# Patient Record
Sex: MALE | Race: WHITE | Employment: OTHER | URBAN - METROPOLITAN AREA
[De-identification: names, ages, dates, MRNs, and addresses within clinical notes are randomized per-mention and may not be internally consistent; named-entity substitution may affect disease eponyms.]

---

## 2017-04-10 ENCOUNTER — HOSPITAL ENCOUNTER (EMERGENCY)
Age: 62
Discharge: HOME | End: 2017-04-10
Attending: EMERGENCY MEDICINE
Payer: COMMERCIAL

## 2017-04-10 ENCOUNTER — APPOINTMENT (OUTPATIENT)
Dept: CT IMAGING | Age: 62
End: 2017-04-10
Attending: PHYSICIAN ASSISTANT
Payer: COMMERCIAL

## 2017-04-10 VITALS
WEIGHT: 225 LBS | DIASTOLIC BLOOD PRESSURE: 71 MMHG | HEART RATE: 63 BPM | TEMPERATURE: 97.7 F | HEIGHT: 72 IN | OXYGEN SATURATION: 94 % | SYSTOLIC BLOOD PRESSURE: 124 MMHG | BODY MASS INDEX: 30.48 KG/M2 | RESPIRATION RATE: 16 BRPM

## 2017-04-10 DIAGNOSIS — J18.9 CAP (COMMUNITY ACQUIRED PNEUMONIA): ICD-10-CM

## 2017-04-10 DIAGNOSIS — R30.0 DYSURIA: Primary | ICD-10-CM

## 2017-04-10 DIAGNOSIS — N20.0 NEPHROLITHIASIS: ICD-10-CM

## 2017-04-10 DIAGNOSIS — R31.0 GROSS HEMATURIA: ICD-10-CM

## 2017-04-10 LAB
ALBUMIN SERPL BCP-MCNC: 4.3 G/DL (ref 3.5–5)
ALBUMIN/GLOB SERPL: 1 {RATIO} (ref 1.1–2.2)
ALP SERPL-CCNC: 106 U/L (ref 45–117)
ALT SERPL-CCNC: 72 U/L (ref 12–78)
ANION GAP BLD CALC-SCNC: 9 MMOL/L (ref 5–15)
APPEARANCE UR: ABNORMAL
AST SERPL W P-5'-P-CCNC: 46 U/L (ref 15–37)
BACTERIA URNS QL MICRO: NEGATIVE /HPF
BASOPHILS # BLD AUTO: 0.1 K/UL (ref 0–0.1)
BASOPHILS # BLD: 1 % (ref 0–1)
BILIRUB SERPL-MCNC: 0.5 MG/DL (ref 0.2–1)
BILIRUB UR QL CFM: NEGATIVE
BUN SERPL-MCNC: 9 MG/DL (ref 6–20)
BUN/CREAT SERPL: 9 (ref 12–20)
CALCIUM SERPL-MCNC: 10.6 MG/DL (ref 8.5–10.1)
CHLORIDE SERPL-SCNC: 102 MMOL/L (ref 97–108)
CO2 SERPL-SCNC: 24 MMOL/L (ref 21–32)
COLOR UR: ABNORMAL
CREAT SERPL-MCNC: 0.97 MG/DL (ref 0.7–1.3)
EOSINOPHIL # BLD: 0.4 K/UL (ref 0–0.4)
EOSINOPHIL NFR BLD: 6 % (ref 0–7)
EPITH CASTS URNS QL MICRO: ABNORMAL /LPF
ERYTHROCYTE [DISTWIDTH] IN BLOOD BY AUTOMATED COUNT: 13.7 % (ref 11.5–14.5)
GLOBULIN SER CALC-MCNC: 4.5 G/DL (ref 2–4)
GLUCOSE SERPL-MCNC: 179 MG/DL (ref 65–100)
GLUCOSE UR STRIP.AUTO-MCNC: NEGATIVE MG/DL
HCT VFR BLD AUTO: 49.8 % (ref 36.6–50.3)
HGB BLD-MCNC: 17 G/DL (ref 12.1–17)
HGB UR QL STRIP: ABNORMAL
KETONES UR QL STRIP.AUTO: 15 MG/DL
LEUKOCYTE ESTERASE UR QL STRIP.AUTO: NEGATIVE
LYMPHOCYTES # BLD AUTO: 22 % (ref 12–49)
LYMPHOCYTES # BLD: 1.5 K/UL (ref 0.8–3.5)
MCH RBC QN AUTO: 30.2 PG (ref 26–34)
MCHC RBC AUTO-ENTMCNC: 34.1 G/DL (ref 30–36.5)
MCV RBC AUTO: 88.5 FL (ref 80–99)
MONOCYTES # BLD: 0.6 K/UL (ref 0–1)
MONOCYTES NFR BLD AUTO: 9 % (ref 5–13)
MUCOUS THREADS URNS QL MICRO: ABNORMAL /LPF
NEUTS SEG # BLD: 4.4 K/UL (ref 1.8–8)
NEUTS SEG NFR BLD AUTO: 62 % (ref 32–75)
NITRITE UR QL STRIP.AUTO: NEGATIVE
PH UR STRIP: 6 [PH] (ref 5–8)
PLATELET # BLD AUTO: 165 K/UL (ref 150–400)
POTASSIUM SERPL-SCNC: 3.8 MMOL/L (ref 3.5–5.1)
PROT SERPL-MCNC: 8.8 G/DL (ref 6.4–8.2)
PROT UR STRIP-MCNC: 300 MG/DL
RBC # BLD AUTO: 5.63 M/UL (ref 4.1–5.7)
RBC #/AREA URNS HPF: >100 /HPF (ref 0–5)
SODIUM SERPL-SCNC: 135 MMOL/L (ref 136–145)
SP GR UR REFRACTOMETRY: 1.02 (ref 1–1.03)
UROBILINOGEN UR QL STRIP.AUTO: 0.2 EU/DL (ref 0.2–1)
WBC # BLD AUTO: 7 K/UL (ref 4.1–11.1)
WBC URNS QL MICRO: ABNORMAL /HPF (ref 0–4)

## 2017-04-10 PROCEDURE — 74011250636 HC RX REV CODE- 250/636: Performed by: PHYSICIAN ASSISTANT

## 2017-04-10 PROCEDURE — 36415 COLL VENOUS BLD VENIPUNCTURE: CPT | Performed by: EMERGENCY MEDICINE

## 2017-04-10 PROCEDURE — 96361 HYDRATE IV INFUSION ADD-ON: CPT

## 2017-04-10 PROCEDURE — 81001 URINALYSIS AUTO W/SCOPE: CPT | Performed by: EMERGENCY MEDICINE

## 2017-04-10 PROCEDURE — 80053 COMPREHEN METABOLIC PANEL: CPT | Performed by: EMERGENCY MEDICINE

## 2017-04-10 PROCEDURE — 85025 COMPLETE CBC W/AUTO DIFF WBC: CPT | Performed by: EMERGENCY MEDICINE

## 2017-04-10 PROCEDURE — 74176 CT ABD & PELVIS W/O CONTRAST: CPT

## 2017-04-10 PROCEDURE — 99283 EMERGENCY DEPT VISIT LOW MDM: CPT

## 2017-04-10 PROCEDURE — 96374 THER/PROPH/DIAG INJ IV PUSH: CPT

## 2017-04-10 RX ORDER — AMOXICILLIN AND CLAVULANATE POTASSIUM 875; 125 MG/1; MG/1
1 TABLET, FILM COATED ORAL 2 TIMES DAILY
Qty: 14 TAB | Refills: 0 | Status: SHIPPED | OUTPATIENT
Start: 2017-04-10 | End: 2017-04-17

## 2017-04-10 RX ORDER — KETOROLAC TROMETHAMINE 30 MG/ML
15 INJECTION, SOLUTION INTRAMUSCULAR; INTRAVENOUS
Status: COMPLETED | OUTPATIENT
Start: 2017-04-10 | End: 2017-04-10

## 2017-04-10 RX ADMIN — SODIUM CHLORIDE 1000 ML: 900 INJECTION, SOLUTION INTRAVENOUS at 11:01

## 2017-04-10 RX ADMIN — KETOROLAC TROMETHAMINE 15 MG: 30 INJECTION INTRAMUSCULAR; INTRAVENOUS at 11:01

## 2017-04-10 NOTE — ED NOTES
Discharge instructions given by provider. Pt ambulates to front door with steady gait and in stable condition.

## 2017-04-10 NOTE — ED TRIAGE NOTES
Pt reports having bright red blood in his urine with difficultly urinating. Pt reports HX of same symptoms with kidney stones.

## 2017-04-10 NOTE — DISCHARGE INSTRUCTIONS
Painful Urination (Dysuria): Care Instructions  Your Care Instructions  Burning pain with urination (dysuria) is a common symptom of a urinary tract infection or other urinary problems. The bladder may become inflamed. This can cause pain when the bladder fills and empties. You may also feel pain if the tube that carries urine from the bladder to the outside of the body (urethra) gets irritated or infected. Sexually transmitted infections (STIs) also may cause pain when you urinate. Sometimes the pain can be caused by things other than an infection. The urethra can be irritated by soaps, perfumes, or foreign objects in the urethra. Kidney stones can cause pain when they pass through the urethra. The cause may be hard to find. You may need tests. Treatment for painful urination depends on the cause. Follow-up care is a key part of your treatment and safety. Be sure to make and go to all appointments, and call your doctor if you are having problems. It's also a good idea to know your test results and keep a list of the medicines you take. How can you care for yourself at home? · Drink extra water for the next day or two. This will help make the urine less concentrated. (If you have kidney, heart, or liver disease and have to limit fluids, talk with your doctor before you increase the amount of fluids you drink.)  · Avoid drinks that are carbonated or have caffeine. They can irritate the bladder. · Urinate often. Try to empty your bladder each time. For women:  · Urinate right after you have sex. · After going to the bathroom, wipe from front to back. · Avoid douches, bubble baths, and feminine hygiene sprays. And avoid other feminine hygiene products that have deodorants. When should you call for help? Call your doctor now or seek immediate medical care if:  · You have new symptoms, such as fever, nausea, or vomiting. · You have new or worse symptoms of a urinary problem.  For example:  ¨ You have blood or pus in your urine. ¨ You have chills or body aches. ¨ It hurts worse to urinate. ¨ You have groin or belly pain. ¨ You have pain in your back just below your rib cage (the flank area). Watch closely for changes in your health, and be sure to contact your doctor if you have any problems. Where can you learn more? Go to http://apollo-tavo.info/. Enter O422 in the search box to learn more about \"Painful Urination (Dysuria): Care Instructions. \"  Current as of: November 28, 2016  Content Version: 11.2  © 4983-8719 [employer]. Care instructions adapted under license by [employer] (which disclaims liability or warranty for this information). If you have questions about a medical condition or this instruction, always ask your healthcare professional. [place] [address] any warranty or liability for your use of this information. Blood in the Urine: Care Instructions  Your Care Instructions  Blood in the urine, or hematuria, may make the urine look red, brown, or pink. There may be blood every time you urinate or just from time to time. You cannot always see blood in the urine, but it will show up in a urine test.  Blood in the urine may be serious. It should always be checked by a doctor. Your doctor may recommend more tests, including an X-ray, a CT scan, or a cystoscopy (which lets a doctor look inside the urethra and bladder). Blood in the urine can be a sign of another problem. Common causes are bladder infections and kidney stones. An injury to your groin or your genital area can also cause bleeding in the urinary tract. Very hard exercise--such as running a marathon--can cause blood in the urine. Blood in the urine can also be a sign of kidney disease or cancer in the bladder or kidney. Many cases of blood in the urine are caused by a harmless condition that runs in families. This is called benign familial hematuria.  It does not need any treatment. Sometimes your urine may look red or brown even though it does not contain blood. For example, not getting enough fluids (dehydration), taking certain medicines, or having a liver problem can change the color of your urine. Eating foods such as beets, rhubarb, or blackberries or foods with red food coloring can make your urine look red or pink. Follow-up care is a key part of your treatment and safety. Be sure to make and go to all appointments, and call your doctor if you are having problems. It's also a good idea to know your test results and keep a list of the medicines you take. When should you call for help? Call your doctor now or seek immediate medical care if:  · You have symptoms of a urinary infection. For example:  ¨ You have pus in your urine. ¨ You have pain in your back just below your rib cage. This is called flank pain. ¨ You have a fever, chills, or body aches. ¨ It hurts to urinate. ¨ You have groin or belly pain. · You have more blood in your urine. Watch closely for changes in your health, and be sure to contact your doctor if:  · You have new urination problems. · You do not get better as expected. Where can you learn more? Go to http://apollo-tavo.info/. Enter M405 in the search box to learn more about \"Blood in the Urine: Care Instructions. \"  Current as of: August 12, 2016  Content Version: 11.2  © 1030-7650 [employer]. Care instructions adapted under license by [employer] (which disclaims liability or warranty for this information). If you have questions about a medical condition or this instruction, always ask your healthcare professional. [place] [address] any warranty or liability for your use of this information. Kidney Stone: Care Instructions  Your Care Instructions    Kidney stones are formed when salts, minerals, and other substances normally found in the urine clump together.  They can be as small as grains of sand or, rarely, as large as golf balls. While the stone is traveling through the ureter, which is the tube that carries urine from the kidney to the bladder, you will probably feel pain. The pain may be mild or very severe. You may also have some blood in your urine. As soon as the stone reaches the bladder, any intense pain should go away. If a stone is too large to pass on its own, you may need a medical procedure to help you pass the stone. The doctor has checked you carefully, but problems can develop later. If you notice any problems or new symptoms, get medical treatment right away. Follow-up care is a key part of your treatment and safety. Be sure to make and go to all appointments, and call your doctor if you are having problems. It's also a good idea to know your test results and keep a list of the medicines you take. How can you care for yourself at home? · Drink plenty of fluids, enough so that your urine is light yellow or clear like water. If you have kidney, heart, or liver disease and have to limit fluids, talk with your doctor before you increase the amount of fluids you drink. · Take pain medicines exactly as directed. Call your doctor if you think you are having a problem with your medicine. ¨ If the doctor gave you a prescription medicine for pain, take it as prescribed. ¨ If you are not taking a prescription pain medicine, ask your doctor if you can take an over-the-counter medicine. Read and follow all instructions on the label. · Your doctor may ask you to strain your urine so that you can collect your kidney stone when it passes. You can use a kitchen strainer or a tea strainer to catch the stone. Store it in a plastic bag until you see your doctor again. Preventing future kidney stones  Some changes in your diet may help prevent kidney stones.  Depending on the cause of your stones, your doctor may recommend that you:  · Drink plenty of fluids, enough so that your urine is light yellow or clear like water. If you have kidney, heart, or liver disease and have to limit fluids, talk with your doctor before you increase the amount of fluids you drink. · Limit coffee, tea, and alcohol. Also avoid grapefruit juice. · Do not take more than the recommended daily dose of vitamins C and D.  · Avoid antacids such as Gaviscon, Maalox, Mylanta, or Tums. · Limit the amount of salt (sodium) in your diet. · Eat a balanced diet that is not too high in protein. · Limit foods that are high in a substance called oxalate, which can cause kidney stones. These foods include dark green vegetables, rhubarb, chocolate, wheat bran, nuts, cranberries, and beans. When should you call for help? Call your doctor now or seek immediate medical care if:  · You cannot keep down fluids. · Your pain gets worse. · You have a fever or chills. · You have new or worse pain in your back just below your rib cage (the flank area). · You have new or more blood in your urine. Watch closely for changes in your health, and be sure to contact your doctor if:  · You do not get better as expected. Where can you learn more? Go to http://apollo-tavo.info/. Enter B251 in the search box to learn more about \"Kidney Stone: Care Instructions. \"  Current as of: November 20, 2015  Content Version: 11.2  © 2034-1734 [employer]. Care instructions adapted under license by [employer] (which disclaims liability or warranty for this information). If you have questions about a medical condition or this instruction, always ask your healthcare professional. [place] [address] any warranty or liability for your use of this information. Pneumonia: Care Instructions  Your Care Instructions    Pneumonia is an infection of the lungs. Most cases are caused by infections from bacteria or viruses. Pneumonia may be mild or very severe.  If it is caused by bacteria, you will be treated with antibiotics. It may take a few weeks to a few months to recover fully from pneumonia, depending on how sick you were and whether your overall health is good. Follow-up care is a key part of your treatment and safety. Be sure to make and go to all appointments, and call your doctor if you are having problems. Its also a good idea to know your test results and keep a list of the medicines you take. How can you care for yourself at home? · Take your antibiotics exactly as directed. Do not stop taking the medicine just because you are feeling better. You need to take the full course of antibiotics. · Take your medicines exactly as prescribed. Call your doctor if you think you are having a problem with your medicine. · Get plenty of rest and sleep. You may feel weak and tired for a while, but your energy level will improve with time. · To prevent dehydration, drink plenty of fluids, enough so that your urine is light yellow or clear like water. Choose water and other caffeine-free clear liquids until you feel better. If you have kidney, heart, or liver disease and have to limit fluids, talk with your doctor before you increase the amount of fluids you drink. · Take care of your cough so you can rest. A cough that brings up mucus from your lungs is common with pneumonia. It is one way your body gets rid of the infection. But if coughing keeps you from resting or causes severe fatigue and chest-wall pain, talk to your doctor. He or she may suggest that you take a medicine to reduce the cough. · Use a vaporizer or humidifier to add moisture to your bedroom. Follow the directions for cleaning the machine. · Do not smoke or allow others to smoke around you. Smoke will make your cough last longer. If you need help quitting, talk to your doctor about stop-smoking programs and medicines. These can increase your chances of quitting for good.   · Take an over-the-counter pain medicine, such as acetaminophen (Tylenol), ibuprofen (Advil, Motrin), or naproxen (Aleve). Read and follow all instructions on the label. · Do not take two or more pain medicines at the same time unless the doctor told you to. Many pain medicines have acetaminophen, which is Tylenol. Too much acetaminophen (Tylenol) can be harmful. · If you were given a spirometer to measure how well your lungs are working, use it as instructed. This can help your doctor tell how your recovery is going. · To prevent pneumonia in the future, talk to your doctor about getting a flu vaccine (once a year) and a pneumococcal vaccine (one time only for most people). When should you call for help? Call 911 anytime you think you may need emergency care. For example, call if:  · You have severe trouble breathing. Call your doctor now or seek immediate medical care if:  · You cough up dark brown or bloody mucus (sputum). · You have new or worse trouble breathing. · You are dizzy or lightheaded, or you feel like you may faint. Watch closely for changes in your health, and be sure to contact your doctor if:  · You have a new or higher fever. · You are coughing more deeply or more often. · You are not getting better after 2 days (48 hours). · You do not get better as expected. Where can you learn more? Go to http://apollo-tavo.info/. Enter [id number] in the search box to learn more about \"Pneumonia: Care Instructions. \"  Current as of: May 23, 2016  Content Version: 11.2  © 9266-6942 [employer]. Care instructions adapted under license by [employer] (which disclaims liability or warranty for this information). If you have questions about a medical condition or this instruction, always ask your healthcare professional. [address] any warranty or liability for your use of this information.

## 2017-04-10 NOTE — ED PROVIDER NOTES
HPI Comments: 60 y.o. male with past medical history significant for HTN, diabetes, depression, fatty liver disease, arthritis, arrhythmia, GERD, high cholesterol, and iron deficiency anemia who presents with chief complaint of hematuria. Pt complains of hematuria, dysuria, and urinary urgency that began earlier this morning. Pt describes his sx as a constant discomfort that becomes much more intense with urination. Pt notes that there are visible blood clots in his urine. No pain meds were taken PTA. Of note, pt mentions a h/o kidney stones and UTI. He states that he is followed by [facility], and was last seen by his urologist ~1 year ago. Pt denies h/o lithotripsy or other intervention for kidney stones. Pt denies flank pain, fever, vomiting, diarrhea, abd pain, CP, SOB, or cold sx. There are no other acute medical concerns at this time. Old Chart Review:  10:35 AM  Pt with a h/o kidney stones and UTI. Last UTI was 1/4/16. Additionally, pt was admitted to the hospital on 2/29/16 for a GI bleed. Social hx: denies tobacco use    PCP: Liliam Ellsworth MD    Note written by Rylan Castillo. Nicole Garcia, as dictated by Justin Dietrich PA-C 10:37 AM      The history is provided by the patient. No  was used.         Past Medical History:   Diagnosis Date    Arrhythmia     R BUNDLE BRANCH BLOCK    Arthritis     Diabetes ([facility] [address]     GERD (gastroesophageal reflux disease)     MARCUS'S ESOPHAGUS    Hypertension     Liver disease     FATTY LIVER    Other ill-defined conditions     High Cholesterol    Other ill-defined conditions     IRON DEFICIENT ANEMIA    Psychiatric disorder     Depression    Unspecified sleep apnea     CPAP       Past Surgical History:   Procedure Laterality Date    HX GI      CHOLECYSTECTOMY    HX GI      COLONOSCOPY    HX GI      ENDOSCOPY, UPPER    HX HEENT      ELELID REDUCTION    HX ORTHOPAEDIC Left     MENISCUS REPAIR    HX OTHER SURGICAL      LIPOMA EXCISION Family History:   Problem Relation Age of Onset    Hypertension Mother    Ambrocio Re Arthritis-osteo Mother     Alzheimer Father     Anesth Problems Neg Hx        Social History     Social History    Marital status:      Spouse name: N/A    Number of children: N/A    Years of education: N/A     Occupational History    Not on file. Social History Main Topics    Smoking status: Never Smoker    Smokeless tobacco: Never Used    Alcohol use Yes      Comment: 4-6 PER WEEK    Drug use: No    Sexual activity: Not on file     Other Topics Concern    Not on file     Social History Narrative         ALLERGIES: Review of patient's allergies indicates no known allergies. Review of Systems   Constitutional: Negative for chills and fever. HENT: Negative for congestion, ear pain, rhinorrhea and sore throat. Respiratory: Negative for cough and shortness of breath. Cardiovascular: Negative for chest pain. Gastrointestinal: Negative for abdominal pain, diarrhea, nausea and vomiting. Genitourinary: Positive for dysuria, hematuria and urgency. Negative for flank pain. Skin: Negative for rash. Neurological: Negative for headaches. All other systems reviewed and are negative. Vitals:    04/10/17 0852   BP: 137/90   Pulse: (!) 110   Resp: 16   Temp: 98.7 °F (37.1 °C)   SpO2: 97%   Weight: 102.1 kg (225 lb)   Height: 6' (1.829 m)            Physical Exam   Constitutional: He is oriented to person, place, and time. He appears well-developed and well-nourished. No distress. Pleasant, well-appearing WM   HENT:   Head: Normocephalic and atraumatic. Right Ear: External ear normal.   Left Ear: External ear normal.   Eyes: Conjunctivae are normal. No scleral icterus. Neck: Neck supple. No tracheal deviation present. Cardiovascular: Normal rate, regular rhythm and normal heart sounds. Exam reveals no gallop and no friction rub. No murmur heard.   Pulmonary/Chest: Effort normal and breath sounds normal. No stridor. No respiratory distress. He has no wheezes. He has no rales. Abdominal: Soft. He exhibits no distension. There is no tenderness. There is no rebound and no guarding. No CVAT   Musculoskeletal: Normal range of motion. Neurological: He is alert and oriented to person, place, and time. Skin: Skin is warm and dry. Psychiatric: He has a normal mood and affect. His behavior is normal.   Nursing note and vitals reviewed. MDM  Number of Diagnoses or Management Options  CAP (community acquired pneumonia):   Dysuria:   Gross hematuria:   Nephrolithiasis:   Diagnosis management comments: 60year old male presenting to the ED for acute onset of hematuria with voiding symptoms this morning. No flank/abd pain, vomiting, or fever. UA with large blood, no WBC, bacteria, other findings c/f infection. CT showing renal stones, no obstruction or hydronephrosis. Incidental finding noted of infectious/inflammatory collection in the left lower lobe, when asked again pt did report cough for about a month, will treat with augmentin. Explicitly explained to pt that he will need close PCP f/u to monitor improvement in cough and also possibly for repeat imaging. Also explained need for urology f/u to monitor hematuria. Amount and/or Complexity of Data Reviewed  Tests in the radiology section of CPT®: ordered and reviewed  Discuss the patient with other providers: yes (Dr. Umesh Fernando, ED attending)  Independent visualization of images, tracings, or specimens: yes (CT)      ED Course       Procedures         Pt reassessed. Discussed CT results. Explained that a stone could have already passed, no signs of infection in the urine. When asked again, pt notes that he has had a persistent, sometimes productive cough x 1 month. Given findings on CT will cover with abx. Discussed need for PCP and urology f/u.   MEGHAN Campbell  12:48 PM

## 2017-08-15 ENCOUNTER — OFFICE VISIT (OUTPATIENT)
Dept: CARDIOLOGY CLINIC | Age: 62
End: 2017-08-15

## 2017-08-15 VITALS
DIASTOLIC BLOOD PRESSURE: 60 MMHG | HEIGHT: 72 IN | HEART RATE: 99 BPM | SYSTOLIC BLOOD PRESSURE: 120 MMHG | OXYGEN SATURATION: 52 % | BODY MASS INDEX: 31.15 KG/M2 | WEIGHT: 230 LBS

## 2017-08-15 DIAGNOSIS — R01.1 CARDIAC MURMUR: Primary | ICD-10-CM

## 2017-08-15 NOTE — PROGRESS NOTES
HISTORY OF PRESENT ILLNESS  Josiane Barajas is a 60 y.o. male. Patient with h/o HTN, DM, GERD, HLD, depression, fatty liver, OLEGARIO on cpap  here for evaluation of murmur and recent echo results  echocardiogram on 7/17 normal lv size , hyperdynamic lv function mild lvh borderline increased aortc outflow velocities normal tv av mv and no dopple abnormalities  Nuclear stress test on 7/13 no ischemia or mi and normal ef  Past Medical History:   Diagnosis Date    Arrhythmia     R BUNDLE BRANCH BLOCK    Arthritis     Diabetes ([address]     GERD (gastroesophageal reflux disease)     MARCUS'S ESOPHAGUS    Hypertension     Liver disease     FATTY LIVER    Other ill-defined conditions     High Cholesterol    Other ill-defined conditions     IRON DEFICIENT ANEMIA    Psychiatric disorder     Depression    Unspecified sleep apnea     CPAP     Past Surgical History:   Procedure Laterality Date    HX GI      CHOLECYSTECTOMY    HX GI      COLONOSCOPY    HX GI      ENDOSCOPY, UPPER    HX HEENT      ELELID REDUCTION    HX ORTHOPAEDIC Left     MENISCUS REPAIR    HX OTHER SURGICAL      LIPOMA EXCISION     Social History     Social History    Marital status:      Spouse name: N/A    Number of children: N/A    Years of education: N/A     Occupational History    Not on file. Social History Main Topics    Smoking status: Never Smoker    Smokeless tobacco: Never Used    Alcohol use Yes      Comment: 4-6 PER WEEK    Drug use: No    Sexual activity: Not on file     Other Topics Concern    Not on file     Social History Narrative       HPI  Not very active, he denies any cp or sob, occasional fatigue reported and seldom dizziness,   Review of Systems   Constitutional: Positive for malaise/fatigue. Respiratory: Negative. Cardiovascular: Negative. Neurological: Positive for dizziness.      Visit Vitals    /60 (BP 1 Location: Left arm, BP Patient Position: Sitting)    Pulse 99    Ht 6' (1.829 m)  Wt 104.3 kg (230 lb)    SpO2 (!) 52%    BMI 31.19 kg/m2       Physical Exam   Neck: No JVD present. Carotid bruit is not present. Cardiovascular: Regular rhythm. Bradycardia present. Pulmonary/Chest: Effort normal and breath sounds normal.   Abdominal: Soft. Musculoskeletal: He exhibits no edema. Psychiatric: He has a normal mood and affect. Current Outpatient Prescriptions on File Prior to Visit   Medication Sig Dispense Refill    insulin glargine (TOUJEO SOLOSTAR) 300 unit/mL (1.5 mL) inpn 80 Units by SubCUTAneous route every morning.  sitaGLIPtin-metFORMIN (JANUMET) 50-1,000 mg per tablet Take 1 Tab by mouth two (2) times daily (with meals).  empagliflozin (JARDIANCE) 10 mg tablet Take 10 mg by mouth daily.  Liraglutide (VICTOZA) 0.6 mg/0.1 mL (18 mg/3 mL) sub-q pen 1.8 mg by SubCUTAneous route daily.  felodipine (PLENDIL SR) 5 mg 24 hr tablet Take 5 mg by mouth daily.  diphenhydrAMINE (BENADRYL) 25 mg capsule Take 25 mg by mouth every six (6) hours as needed.  diphenhydramine-acetaminophen (EXCEDRIN PM)  mg Tab Take 2 Tabs by mouth daily as needed.  Cholecalciferol, Vitamin D3, 2,000 unit cap Take 2,000 Units by mouth daily.  escitalopram (LEXAPRO) 20 mg tablet Take 30 mg by mouth daily. Takes 30 mg daily      quinapril (ACCUPRIL) 40 mg tablet Take 40 mg by mouth two (2) times a day.  rosuvastatin (CRESTOR) 40 mg tablet Take 40 mg by mouth daily. NON FORMULARY       No current facility-administered medications on file prior to visit. ASSESSMENT and PLAN  Murmur: discussed at length results of echocardiogram and their implications. Mild lvh likely related to long standing htn, hyperdynamic ventricle likely responsible for murmur, lvot obstruction seems mild by description and I do not feel there is a need for pharmacological intervention at this time.  If there was a need though that would be an issue since I do not think he could tolerate either bblockers or ca blockers on account of his resting bradycardia  Abnormal ECG: this showed sinus bradycardia , RBBB and nstt. Discussed with him likely conduction disease as well , he will follow up with his pcp and have asked him to let us know if any more frequent dizziness or presyncopal syncopal episode.  Consider 24 hours holter as outpatient as well he will discuss that with his pcp  Also given his risk factors 9 in particular DM) we have discussed potential for cad screening with stress test every 3-5 years  HTN: this eems well controlled today  HLD: closely followed by his PCP  See him back otherwise on a prn base encouraged him though to exercise more and loose weight

## 2017-12-01 ENCOUNTER — HOSPITAL ENCOUNTER (OUTPATIENT)
Dept: PREADMISSION TESTING | Age: 62
Discharge: HOME | End: 2017-12-01

## 2017-12-01 VITALS
HEART RATE: 67 BPM | DIASTOLIC BLOOD PRESSURE: 87 MMHG | WEIGHT: 221 LBS | RESPIRATION RATE: 20 BRPM | BODY MASS INDEX: 29.29 KG/M2 | TEMPERATURE: 98.2 F | SYSTOLIC BLOOD PRESSURE: 148 MMHG | HEIGHT: 73 IN

## 2017-12-04 NOTE — H&P
Chief Complaint: Pain and Follow-up of the Right Knee     Sonya Farley comes in for follow-up of the right knee. He underwent right knee arthroscopy with meniscectomy and chondroplasty in 2014 and has continued right knee pain. He notes a continued popping sensation which causes pain. He was sent for MRI of the right knee and comes in for follow-up and review of MRI results. Objective:   Constitutional:  No acute distress. Well nourished. Well developed. Eyes:  Sclera are nonicteric. Respiratory:  No labored breathing. Cardiovascular:  No marked edema. Skin:  No marked skin ulcers. Neurological:  No marked sensory loss noted. Psychiatric: Alert and oriented x3. Musculoskeletal       On exam the right knee is stable. The right knee has full extension and flexes 120 degrees. When it comes into extension there is popping and crepitus with lateral tracking of the patella. No sign of infection. No effusion. No cellulitis or rash. No evidence of calf pain, no sign of DVT. The extensor mechanism is intact. The neurovascular status is intact. Radiographs:        X-ray Knee Right 1 Or 2 Views ([id number])     Result Date: 10/24/2017  Views: Lexine Plaster. Notes  Indication(s): Right knee pain. Impression: I ordered and interpreted X-rays of the right knee. The X-rays reveal degenerative changes of the patellofemoral joint. No fracture. No sign of metastatic disease.      Mri Knee Right Without Contrast ([id number])     Result Date: 10/20/2017  INDICATION: Right knee pain. Prior arthroscopic surgery. COMPARISON: None TECHNIQUE: Axial T2 fat-saturated and proton density fat-saturated; coronal T1 and proton density fat-saturated; and sagittal T2 fat-saturated, proton density fat-saturated, and gradient echo MRI of the right knee . CONTRAST:  None. FINDINGS: Bone marrow: Within normal limits. No acute fracture, dislocation, or marrow replacing process. Joint fluid: None.  Collateral ligaments and posterior, lateral corner: Intact. Medial meniscus: The anterior horn is nearly absent. There is truncation of the free edge of the body. The body is extruded laterally. Findings are likely related to prior meniscectomy. The posterior horn is intact. Lateral meniscus: Degenerative signal is seen in the junction of the body and posterior horn. No evidence of a tear. ACL and PCL: Intact. Tendons: Intact. Muscles: Within normal limits. Patellofemoral alignment: There is mild lateral subluxation and tilting of the patella. There is mild edema in the superior lateral Hoffa's fat pad. TT-TG distance: 20 mm. Articular cartilage: There is irregular high-grade cartilage loss throughout the lateral patellar facet with a small area of full-thickness loss measuring approximately 6 x 5 mm with underlying edema. Soft tissue mass: None. IMPRESSION: 1. Post surgical changes in the lateral meniscus. 2. Evidence of a lateral patellar tracking abnormality with significant chondromalacia in the lateral patellar facet. Rasheeda Gutiérrez         Assessment:      1. Chondromalacia of right knee    2. Patellar tracking disorder of right knee    3. Chronic pain of right knee            Plan:   I reviewed the findings of the MRI revealing no meniscal pathology. I explained that he has lateral tracking of the patella and explained the pathophysiology. We discussed treatment options. He has previously tried cortisone injections and continues to have knee pain. I explained that the next treatment option is right knee arthroscopy with meniscectomy and chondroplasty with possible lateral retinacular release.      I explained the hospitalization, post-op and rehabilitation for the procedure. The patient is aware of all complications associated with the surgery, including the chance of infection and blood clots.  He understands that he would be on antibiotics and anti-coagulants following surgery to prevent infection and DVT and that he would undergo a course of post-op physical therapy for rehabilitation.     I discussed with the patient that he does have underlying degenerative changes and I cannot predict the degree to which he will experience relief following arthroscopy and he may eventually need to consider total knee replacement. At this time I believe arthroscopy is the best treatment option. Following discussion he agrees with this and we will schedule the surgery after Thanksgiving.     Patient has marked patellofemoral pain and crepitance when the knees brought into full extension there is significant lateral tracking of the patella. I reviewed with him the findings of the MRI. I do not feel that he has significant meniscal pathology. I feel the symptoms are more related to chondromalacia and lateral tracking patella. He feels that he has failed conservative treatment. We discussed treatment options. He understands that he may eventually need to consider knee replacement but not at this time. I feel that be reasonable to consider knee arthroscopy with attention to meniscal and chondral pathology along with lateral retinacular release. I reviewed with him the procedure and all potential complications including infection and DVT. I also again explained there is the potential that he may eventually need to consider knee replacement. PROCEDURE: Right knee arthroscopy, meniscectomy and possible lateral retinacular release    Date of Surgery Update:  Don Lloyd was seen and examined.   Past Medical History:   Diagnosis Date    Arrhythmia     R BUNDLE BRANCH BLOCK    Arthritis     Diabetes ([facility] [address]     GERD (gastroesophageal reflux disease)     MARCUS'S ESOPHAGUS    Hypertension     Liver disease     FATTY LIVER    Other ill-defined conditions(799.89)     High Cholesterol    Other ill-defined conditions(799.89)     IRON DEFICIENT ANEMIA    Psychiatric disorder     Depression    Unspecified sleep apnea     CPAP     Prior to Admission Medications   Prescriptions Last Dose Informant Patient Reported? Taking? Cholecalciferol, Vitamin D3, 2,000 unit cap   Yes No   Sig: Take 2,000 Units by mouth daily. Liraglutide (VICTOZA) 0.6 mg/0.1 mL (18 mg/3 mL) sub-q pen   Yes No   Si.8 mg by SubCUTAneous route daily. diphenhydramine-acetaminophen (EXCEDRIN PM)  mg Tab   Yes No   Sig: Take 2 Tabs by mouth daily as needed. empagliflozin (JARDIANCE) 10 mg tablet   Yes No   Sig: Take 10 mg by mouth daily. escitalopram (LEXAPRO) 20 mg tablet   Yes No   Sig: Take 30 mg by mouth daily. Takes 30 mg daily   felodipine (PLENDIL SR) 5 mg 24 hr tablet   Yes No   Sig: Take 5 mg by mouth daily. insulin glargine (TOUJEO SOLOSTAR) 300 unit/mL (1.5 mL) inpn  Self Yes No   Si Units by SubCUTAneous route every morning.   quinapril (ACCUPRIL) 40 mg tablet   Yes No   Sig: Take 40 mg by mouth two (2) times a day. rosuvastatin (CRESTOR) 40 mg tablet   Yes No   Sig: Take 40 mg by mouth nightly. NON FORMULARY    sitaGLIPtin-metFORMIN (JANUMET) 50-1,000 mg per tablet   Yes No   Sig: Take 1 Tab by mouth two (2) times daily (with meals). Facility-Administered Medications: None      Allergy to:Review of patient's allergies indicates no known allergies. Physical Examination: General appearance - alert, well appearing, and in no distress  History and physical has been reviewed. The patient has been examined. There have been no significant clinical changes since the completion of the originally dated History and Physical.    Signed By: Kerrie Cruz PA-C     2017 7:35 AM      ADDENDUM: 17 @10:10. Surgery cancelled. Knee evaluated and noted small laceration several inches from potential portal incision sites. Glucose of 290, poorly controlled diabetes. Case is to be re-scheculed for a later date.

## 2017-12-06 RX ORDER — FENTANYL CITRATE 50 UG/ML
25 INJECTION, SOLUTION INTRAMUSCULAR; INTRAVENOUS
Status: CANCELLED | OUTPATIENT
Start: 2017-12-06

## 2017-12-06 RX ORDER — MIDAZOLAM HYDROCHLORIDE 1 MG/ML
0.5 INJECTION, SOLUTION INTRAMUSCULAR; INTRAVENOUS
Status: CANCELLED | OUTPATIENT
Start: 2017-12-06

## 2017-12-06 RX ORDER — SODIUM CHLORIDE 0.9 % (FLUSH) 0.9 %
5-10 SYRINGE (ML) INJECTION AS NEEDED
Status: CANCELLED | OUTPATIENT
Start: 2017-12-06

## 2017-12-06 RX ORDER — MORPHINE SULFATE 10 MG/ML
2 INJECTION, SOLUTION INTRAMUSCULAR; INTRAVENOUS
Status: CANCELLED | OUTPATIENT
Start: 2017-12-06

## 2017-12-06 RX ORDER — SODIUM CHLORIDE, SODIUM LACTATE, POTASSIUM CHLORIDE, CALCIUM CHLORIDE 600; 310; 30; 20 MG/100ML; MG/100ML; MG/100ML; MG/100ML
100 INJECTION, SOLUTION INTRAVENOUS CONTINUOUS
Status: CANCELLED | OUTPATIENT
Start: 2017-12-06

## 2017-12-06 RX ORDER — DIPHENHYDRAMINE HYDROCHLORIDE 50 MG/ML
12.5 INJECTION, SOLUTION INTRAMUSCULAR; INTRAVENOUS AS NEEDED
Status: CANCELLED | OUTPATIENT
Start: 2017-12-06 | End: 2017-12-06

## 2017-12-07 ENCOUNTER — HOSPITAL ENCOUNTER (OUTPATIENT)
Age: 62
Setting detail: OUTPATIENT SURGERY
Discharge: HOME | End: 2017-12-07
Attending: ORTHOPAEDIC SURGERY | Admitting: ORTHOPAEDIC SURGERY
Payer: COMMERCIAL

## 2017-12-07 LAB
GLUCOSE BLD STRIP.AUTO-MCNC: 249 MG/DL (ref 65–100)
SERVICE CMNT-IMP: ABNORMAL

## 2017-12-07 PROCEDURE — 82962 GLUCOSE BLOOD TEST: CPT

## 2017-12-07 RX ORDER — FENTANYL CITRATE 50 UG/ML
50 INJECTION, SOLUTION INTRAMUSCULAR; INTRAVENOUS AS NEEDED
Status: DISCONTINUED | OUTPATIENT
Start: 2017-12-07 | End: 2017-12-07 | Stop reason: HOSPADM

## 2017-12-07 RX ORDER — MIDAZOLAM HYDROCHLORIDE 1 MG/ML
1 INJECTION, SOLUTION INTRAMUSCULAR; INTRAVENOUS AS NEEDED
Status: DISCONTINUED | OUTPATIENT
Start: 2017-12-07 | End: 2017-12-07 | Stop reason: HOSPADM

## 2017-12-07 RX ORDER — ROPIVACAINE HYDROCHLORIDE 5 MG/ML
150 INJECTION, SOLUTION EPIDURAL; INFILTRATION; PERINEURAL AS NEEDED
Status: DISCONTINUED | OUTPATIENT
Start: 2017-12-07 | End: 2017-12-07 | Stop reason: HOSPADM

## 2017-12-07 RX ORDER — SODIUM CHLORIDE 0.9 % (FLUSH) 0.9 %
5-10 SYRINGE (ML) INJECTION AS NEEDED
Status: DISCONTINUED | OUTPATIENT
Start: 2017-12-07 | End: 2017-12-07 | Stop reason: HOSPADM

## 2017-12-07 RX ORDER — CEFAZOLIN SODIUM 2 G/50ML
2 SOLUTION INTRAVENOUS
Status: DISCONTINUED | OUTPATIENT
Start: 2017-12-07 | End: 2017-12-07 | Stop reason: HOSPADM

## 2017-12-07 RX ORDER — SODIUM CHLORIDE, SODIUM LACTATE, POTASSIUM CHLORIDE, CALCIUM CHLORIDE 600; 310; 30; 20 MG/100ML; MG/100ML; MG/100ML; MG/100ML
100 INJECTION, SOLUTION INTRAVENOUS CONTINUOUS
Status: DISCONTINUED | OUTPATIENT
Start: 2017-12-07 | End: 2017-12-07 | Stop reason: HOSPADM

## 2017-12-07 RX ORDER — LIDOCAINE HYDROCHLORIDE 10 MG/ML
0.1 INJECTION, SOLUTION EPIDURAL; INFILTRATION; INTRACAUDAL; PERINEURAL AS NEEDED
Status: DISCONTINUED | OUTPATIENT
Start: 2017-12-07 | End: 2017-12-07 | Stop reason: HOSPADM

## 2017-12-07 RX ORDER — SODIUM CHLORIDE 0.9 % (FLUSH) 0.9 %
5-10 SYRINGE (ML) INJECTION EVERY 8 HOURS
Status: DISCONTINUED | OUTPATIENT
Start: 2017-12-07 | End: 2017-12-07 | Stop reason: HOSPADM

## 2018-01-16 NOTE — H&P
Chief Complaint: Pain of the Right Knee     Brandyn Duarte comes in for follow-up of the right knee. He was scheduled for right knee arthroscopy last month but sustained an abrasion on the knee. He previously underwent right knee arthroscopy in 2014 and continues to have right knee pain. He has tried cortisone injections without any significant relief. His glucose is now reasonably well controlled.     Review of Systems   1/15/2018  Constitutional: Unexplained: Positive  Genitourinary: Frequent Urination: Positive  HEENT: Vision Loss: Negative  Neurological: Memory Loss: Negative  Integumentary: Rash: Negative  Cardiovascular: Palpatations: Positive  Hematologic: Bruises/Bleeds Easily: Negative  Gastrointestinal: Constipation: Negative  Immunological: Seasonal Allergies: Positive  Musculoskeletal: Joint Pain: Positive      Medical History         Past Medical History:   Diagnosis Date    Depression      Diabetes mellitus      Hypertension      Sleep apnea               Surgical History          Past Surgical History:   Procedure Laterality Date    KNEE SURGERY        NO RELEVANT SURGERIES                Objective:          Vitals:     01/15/18 0837   BP: (!) 134/82         Constitutional:  No acute distress. Well nourished. Well developed. Eyes:  Sclera are nonicteric. Respiratory:  No labored breathing. Cardiovascular:  No marked edema. Skin:  No marked skin ulcers. Neurological:  No marked sensory loss noted. Psychiatric: Alert and oriented x3. Musculoskeletal      On exam of the right knee the abrasion is well healed. No sign of infection. No effusion. No ecchymosis or erythema. No cellulitis or rash. No calf pain, no evidence of DVT. The extensor mechanism is intact. The neurovascular status is intact. Imaging/Studies:    No imaging obtained      Assessment:      1. Chondromalacia of right knee    2.  Derangement, knee internal, right             Plan:   The patient was scheduled for right knee arthroscopy last month. His abrasion is now well healed and he continues to have knee pain. He would like to proceed with right knee arthroscopy with attention to meniscal and chondral pathology. I reviewed with him the hospitalization, post-op and rehabilitation for the procedure. The patient is aware of all complications associated with the surgery, including the chance of infection and blood clots. He understands that he would be on antibiotics and anti-coagulants following surgery to prevent infection and DVT. I explained that I cannot predict the degree to which he will note improvement following the surgery as this depends on the degree of chondromalacia. I also discussed benefits of weight loss and encouraged him to work on losing weight to reduce stress on the knees. PROCEDURE: Right knee arthroscopy and meniscectomy. Date of Surgery Update:  Arjun Weber was seen and examined. Past Medical History:   Diagnosis Date    Arrhythmia     R BUNDLE BRANCH BLOCK    Arthritis     Diabetes ([facility] [address]     TYPE II, INSULIN DEPENDANT    GERD (gastroesophageal reflux disease)     MARCUS'S ESOPHAGUS    Hypertension     Liver disease     FATTY LIVER    Other ill-defined conditions(799.89)     High Cholesterol    Other ill-defined conditions(799.89)     IRON DEFICIENT ANEMIA    Psychiatric disorder     Depression    Unspecified sleep apnea     CPAP     Prior to Admission Medications   Prescriptions Last Dose Informant Patient Reported? Taking? Cholecalciferol, Vitamin D3, 2,000 unit cap  Self Yes No   Sig: Take 2,000 Units by mouth daily. Liraglutide (VICTOZA) 0.6 mg/0.1 mL (18 mg/3 mL) sub-q pen  Self Yes No   Si.8 mg by SubCUTAneous route daily. diphenhydramine-acetaminophen (EXCEDRIN PM)  mg Tab  Self Yes No   Sig: Take 2 Tabs by mouth daily as needed. empagliflozin (JARDIANCE) 10 mg tablet  Self Yes No   Sig: Take 10 mg by mouth daily.    escitalopram (LEXAPRO) 20 mg tablet  Self Yes No   Sig: Take 30 mg by mouth daily. Takes 30 mg daily   felodipine (PLENDIL SR) 5 mg 24 hr tablet  Self Yes No   Sig: Take 5 mg by mouth daily. insulin glargine (TOUJEO SOLOSTAR) 300 unit/mL (1.5 mL) inpn  Self Yes No   Si Units by SubCUTAneous route two (2) times a day. MORNING AND BEDTIME. insulin lispro (HUMALOG) 100 unit/mL injection  Self Yes No   Sig: 10 Units by SubCUTAneous route two (2) times a day. LUNCH AND DINNER   quinapril (ACCUPRIL) 40 mg tablet  Self Yes No   Sig: Take 40 mg by mouth two (2) times a day. rosuvastatin (CRESTOR) 40 mg tablet  Self Yes No   Sig: Take 40 mg by mouth nightly. NON FORMULARY    sitaGLIPtin-metFORMIN (JANUMET) 50-1,000 mg per tablet  Self Yes No   Sig: Take 1 Tab by mouth two (2) times daily (with meals). Facility-Administered Medications: None      Allergy to:Review of patient's allergies indicates no known allergies. Physical Examination: General appearance - alert, well appearing, and in no distress  History and physical has been reviewed. The patient has been examined.  There have been no significant clinical changes since the completion of the originally dated History and Physical.    Signed By: Nomi Sullivan PA-C     2018 7:45 AM

## 2018-01-17 ENCOUNTER — HOSPITAL ENCOUNTER (OUTPATIENT)
Dept: PREADMISSION TESTING | Age: 63
Discharge: HOME | End: 2018-01-17
Payer: COMMERCIAL

## 2018-01-17 VITALS
WEIGHT: 219.38 LBS | SYSTOLIC BLOOD PRESSURE: 161 MMHG | DIASTOLIC BLOOD PRESSURE: 80 MMHG | BODY MASS INDEX: 29.08 KG/M2 | HEIGHT: 73 IN | HEART RATE: 120 BPM | TEMPERATURE: 98.2 F

## 2018-01-17 LAB
ATRIAL RATE: 91 BPM
CALCULATED P AXIS, ECG09: 46 DEGREES
CALCULATED R AXIS, ECG10: 66 DEGREES
CALCULATED T AXIS, ECG11: 9 DEGREES
DIAGNOSIS, 93000: NORMAL
P-R INTERVAL, ECG05: 130 MS
Q-T INTERVAL, ECG07: 384 MS
QRS DURATION, ECG06: 140 MS
QTC CALCULATION (BEZET), ECG08: 472 MS
VENTRICULAR RATE, ECG03: 91 BPM

## 2018-01-17 PROCEDURE — 93005 ELECTROCARDIOGRAM TRACING: CPT

## 2018-01-17 RX ORDER — INSULIN LISPRO 100 [IU]/ML
10 INJECTION, SOLUTION INTRAVENOUS; SUBCUTANEOUS 2 TIMES DAILY
COMMUNITY

## 2018-01-17 NOTE — PERIOP NOTES
PT/FAMILY PROVIDED AND REVIEWED PRE-OP INSTRUCTION SHEET. PATIENT GIVEN SURGICAL SITE INFORMATION FAQS INFORMATION HANDOUT. PT PROVIDED WITH GOOD HAND HYGIENE TIPS. PT GIVEN OPPORTUNITY TO ASK QUESTIONS.   PATIENT PROVIDED WITH MARTINE WIPES, ORAL AND WRITTEN INSTRUCTIONS

## 2018-01-17 NOTE — PERIOP NOTES
CONSULTED DR. MARTINEZ, ANESTHESIOLOGIST IF NEW EKG IS NEEDED TODAY. PT HAD EKG DONE AUGUST 2017 THAT SHOWED SINUS BRADYCARDIA 50 BPM AND RBBB. WHEN I DID PATIENTS VITALS HIS PULSE  BPM. DR. Sivan Barboza CONSULTED TO HAVE ANOTHER EKG DONE TODAY.

## 2018-01-18 ENCOUNTER — HOSPITAL ENCOUNTER (OUTPATIENT)
Age: 63
Setting detail: OUTPATIENT SURGERY
Discharge: HOME | End: 2018-01-19
Attending: ORTHOPAEDIC SURGERY | Admitting: ORTHOPAEDIC SURGERY
Payer: COMMERCIAL

## 2018-01-18 ENCOUNTER — ANESTHESIA (OUTPATIENT)
Dept: SURGERY | Age: 63
End: 2018-01-18
Payer: COMMERCIAL

## 2018-01-18 ENCOUNTER — ANESTHESIA EVENT (OUTPATIENT)
Dept: SURGERY | Age: 63
End: 2018-01-18
Payer: COMMERCIAL

## 2018-01-18 VITALS
SYSTOLIC BLOOD PRESSURE: 119 MMHG | TEMPERATURE: 98 F | OXYGEN SATURATION: 92 % | RESPIRATION RATE: 15 BRPM | BODY MASS INDEX: 29.08 KG/M2 | DIASTOLIC BLOOD PRESSURE: 75 MMHG | HEART RATE: 84 BPM | WEIGHT: 219.38 LBS | HEIGHT: 73 IN

## 2018-01-18 DIAGNOSIS — S83.241D TEAR OF MEDIAL MENISCUS OF RIGHT KNEE, CURRENT, UNSPECIFIED TEAR TYPE, SUBSEQUENT ENCOUNTER: Primary | ICD-10-CM

## 2018-01-18 LAB
GLUCOSE BLD STRIP.AUTO-MCNC: 150 MG/DL (ref 65–100)
GLUCOSE BLD STRIP.AUTO-MCNC: 174 MG/DL (ref 65–100)
SERVICE CMNT-IMP: ABNORMAL
SERVICE CMNT-IMP: ABNORMAL

## 2018-01-18 PROCEDURE — 74011250636 HC RX REV CODE- 250/636

## 2018-01-18 PROCEDURE — 77030002916 HC SUT ETHLN J&J -A: Performed by: ORTHOPAEDIC SURGERY

## 2018-01-18 PROCEDURE — 77030020143 HC AIRWY LARYN INTUB CGAS -A: Performed by: ANESTHESIOLOGY

## 2018-01-18 PROCEDURE — 77030020782 HC GWN BAIR PAWS FLX 3M -B

## 2018-01-18 PROCEDURE — 77030006884 HC BLD SHV INCIS S&N -B: Performed by: ORTHOPAEDIC SURGERY

## 2018-01-18 PROCEDURE — 76210000000 HC OR PH I REC 2 TO 2.5 HR: Performed by: ORTHOPAEDIC SURGERY

## 2018-01-18 PROCEDURE — 74011250636 HC RX REV CODE- 250/636: Performed by: ANESTHESIOLOGY

## 2018-01-18 PROCEDURE — 77030018835 HC SOL IRR LR ICUM -A: Performed by: ORTHOPAEDIC SURGERY

## 2018-01-18 PROCEDURE — 76210000020 HC REC RM PH II FIRST 0.5 HR: Performed by: ORTHOPAEDIC SURGERY

## 2018-01-18 PROCEDURE — 74011000250 HC RX REV CODE- 250: Performed by: ORTHOPAEDIC SURGERY

## 2018-01-18 PROCEDURE — 76060000033 HC ANESTHESIA 1 TO 1.5 HR: Performed by: ORTHOPAEDIC SURGERY

## 2018-01-18 PROCEDURE — 74011250636 HC RX REV CODE- 250/636: Performed by: PHYSICIAN ASSISTANT

## 2018-01-18 PROCEDURE — 74011000250 HC RX REV CODE- 250

## 2018-01-18 PROCEDURE — 82962 GLUCOSE BLOOD TEST: CPT

## 2018-01-18 PROCEDURE — 77030018735: Performed by: ORTHOPAEDIC SURGERY

## 2018-01-18 PROCEDURE — 77030028224 HC PDNG CST BSNM -A: Performed by: ORTHOPAEDIC SURGERY

## 2018-01-18 PROCEDURE — 76010000149 HC OR TIME 1 TO 1.5 HR: Performed by: ORTHOPAEDIC SURGERY

## 2018-01-18 RX ORDER — SODIUM CHLORIDE 9 MG/ML
25 INJECTION, SOLUTION INTRAVENOUS CONTINUOUS
Status: DISCONTINUED | OUTPATIENT
Start: 2018-01-18 | End: 2018-01-18 | Stop reason: HOSPADM

## 2018-01-18 RX ORDER — SODIUM CHLORIDE, SODIUM LACTATE, POTASSIUM CHLORIDE, CALCIUM CHLORIDE 600; 310; 30; 20 MG/100ML; MG/100ML; MG/100ML; MG/100ML
25 INJECTION, SOLUTION INTRAVENOUS CONTINUOUS
Status: DISCONTINUED | OUTPATIENT
Start: 2018-01-18 | End: 2018-01-18 | Stop reason: HOSPADM

## 2018-01-18 RX ORDER — ROPIVACAINE HYDROCHLORIDE 5 MG/ML
30 INJECTION, SOLUTION EPIDURAL; INFILTRATION; PERINEURAL AS NEEDED
Status: DISCONTINUED | OUTPATIENT
Start: 2018-01-18 | End: 2018-01-18 | Stop reason: HOSPADM

## 2018-01-18 RX ORDER — CEFAZOLIN SODIUM/WATER 2 G/20 ML
2 SYRINGE (ML) INTRAVENOUS EVERY 8 HOURS
Status: DISCONTINUED | OUTPATIENT
Start: 2018-01-18 | End: 2018-01-18 | Stop reason: HOSPADM

## 2018-01-18 RX ORDER — DIPHENHYDRAMINE HYDROCHLORIDE 50 MG/ML
12.5 INJECTION, SOLUTION INTRAMUSCULAR; INTRAVENOUS AS NEEDED
Status: ACTIVE | OUTPATIENT
Start: 2018-01-18 | End: 2018-01-18

## 2018-01-18 RX ORDER — SODIUM CHLORIDE 0.9 % (FLUSH) 0.9 %
5-10 SYRINGE (ML) INJECTION AS NEEDED
Status: DISCONTINUED | OUTPATIENT
Start: 2018-01-18 | End: 2018-01-20 | Stop reason: HOSPADM

## 2018-01-18 RX ORDER — FENTANYL CITRATE 50 UG/ML
50 INJECTION, SOLUTION INTRAMUSCULAR; INTRAVENOUS AS NEEDED
Status: DISCONTINUED | OUTPATIENT
Start: 2018-01-18 | End: 2018-01-18 | Stop reason: HOSPADM

## 2018-01-18 RX ORDER — ONDANSETRON 2 MG/ML
4 INJECTION INTRAMUSCULAR; INTRAVENOUS AS NEEDED
Status: DISCONTINUED | OUTPATIENT
Start: 2018-01-18 | End: 2018-01-20 | Stop reason: HOSPADM

## 2018-01-18 RX ORDER — FENTANYL CITRATE 50 UG/ML
INJECTION, SOLUTION INTRAMUSCULAR; INTRAVENOUS AS NEEDED
Status: DISCONTINUED | OUTPATIENT
Start: 2018-01-18 | End: 2018-01-18 | Stop reason: HOSPADM

## 2018-01-18 RX ORDER — EPHEDRINE SULFATE 50 MG/ML
INJECTION, SOLUTION INTRAVENOUS AS NEEDED
Status: DISCONTINUED | OUTPATIENT
Start: 2018-01-18 | End: 2018-01-18 | Stop reason: HOSPADM

## 2018-01-18 RX ORDER — SODIUM CHLORIDE, SODIUM LACTATE, POTASSIUM CHLORIDE, CALCIUM CHLORIDE 600; 310; 30; 20 MG/100ML; MG/100ML; MG/100ML; MG/100ML
100 INJECTION, SOLUTION INTRAVENOUS CONTINUOUS
Status: DISCONTINUED | OUTPATIENT
Start: 2018-01-18 | End: 2018-01-20 | Stop reason: HOSPADM

## 2018-01-18 RX ORDER — MIDAZOLAM HYDROCHLORIDE 1 MG/ML
INJECTION, SOLUTION INTRAMUSCULAR; INTRAVENOUS AS NEEDED
Status: DISCONTINUED | OUTPATIENT
Start: 2018-01-18 | End: 2018-01-18 | Stop reason: HOSPADM

## 2018-01-18 RX ORDER — PROPOFOL 10 MG/ML
INJECTION, EMULSION INTRAVENOUS AS NEEDED
Status: DISCONTINUED | OUTPATIENT
Start: 2018-01-18 | End: 2018-01-18 | Stop reason: HOSPADM

## 2018-01-18 RX ORDER — SODIUM CHLORIDE 0.9 % (FLUSH) 0.9 %
5-10 SYRINGE (ML) INJECTION EVERY 8 HOURS
Status: DISCONTINUED | OUTPATIENT
Start: 2018-01-18 | End: 2018-01-18 | Stop reason: HOSPADM

## 2018-01-18 RX ORDER — OXYCODONE AND ACETAMINOPHEN 5; 325 MG/1; MG/1
1 TABLET ORAL
Qty: 42 TAB | Refills: 0 | Status: SHIPPED | OUTPATIENT
Start: 2018-01-18 | End: 2020-02-27

## 2018-01-18 RX ORDER — FENTANYL CITRATE 50 UG/ML
25 INJECTION, SOLUTION INTRAMUSCULAR; INTRAVENOUS
Status: DISCONTINUED | OUTPATIENT
Start: 2018-01-18 | End: 2018-01-20 | Stop reason: HOSPADM

## 2018-01-18 RX ORDER — LIDOCAINE HYDROCHLORIDE 10 MG/ML
0.1 INJECTION, SOLUTION EPIDURAL; INFILTRATION; INTRACAUDAL; PERINEURAL AS NEEDED
Status: DISCONTINUED | OUTPATIENT
Start: 2018-01-18 | End: 2018-01-18 | Stop reason: HOSPADM

## 2018-01-18 RX ORDER — SODIUM CHLORIDE 0.9 % (FLUSH) 0.9 %
5-10 SYRINGE (ML) INJECTION AS NEEDED
Status: DISCONTINUED | OUTPATIENT
Start: 2018-01-18 | End: 2018-01-18 | Stop reason: HOSPADM

## 2018-01-18 RX ORDER — MIDAZOLAM HYDROCHLORIDE 1 MG/ML
0.5 INJECTION, SOLUTION INTRAMUSCULAR; INTRAVENOUS
Status: DISCONTINUED | OUTPATIENT
Start: 2018-01-18 | End: 2018-01-20 | Stop reason: HOSPADM

## 2018-01-18 RX ORDER — OXYCODONE HYDROCHLORIDE 5 MG/1
5 TABLET ORAL AS NEEDED
Status: DISCONTINUED | OUTPATIENT
Start: 2018-01-18 | End: 2018-01-20 | Stop reason: HOSPADM

## 2018-01-18 RX ORDER — ONDANSETRON 2 MG/ML
INJECTION INTRAMUSCULAR; INTRAVENOUS AS NEEDED
Status: DISCONTINUED | OUTPATIENT
Start: 2018-01-18 | End: 2018-01-18 | Stop reason: HOSPADM

## 2018-01-18 RX ORDER — LIDOCAINE HYDROCHLORIDE 20 MG/ML
INJECTION, SOLUTION EPIDURAL; INFILTRATION; INTRACAUDAL; PERINEURAL AS NEEDED
Status: DISCONTINUED | OUTPATIENT
Start: 2018-01-18 | End: 2018-01-18 | Stop reason: HOSPADM

## 2018-01-18 RX ORDER — HYDROMORPHONE HYDROCHLORIDE 1 MG/ML
0.2 INJECTION, SOLUTION INTRAMUSCULAR; INTRAVENOUS; SUBCUTANEOUS
Status: DISCONTINUED | OUTPATIENT
Start: 2018-01-18 | End: 2018-01-20 | Stop reason: HOSPADM

## 2018-01-18 RX ORDER — MIDAZOLAM HYDROCHLORIDE 1 MG/ML
1 INJECTION, SOLUTION INTRAMUSCULAR; INTRAVENOUS AS NEEDED
Status: DISCONTINUED | OUTPATIENT
Start: 2018-01-18 | End: 2018-01-18 | Stop reason: HOSPADM

## 2018-01-18 RX ORDER — DEXAMETHASONE SODIUM PHOSPHATE 4 MG/ML
INJECTION, SOLUTION INTRA-ARTICULAR; INTRALESIONAL; INTRAMUSCULAR; INTRAVENOUS; SOFT TISSUE AS NEEDED
Status: DISCONTINUED | OUTPATIENT
Start: 2018-01-18 | End: 2018-01-18 | Stop reason: HOSPADM

## 2018-01-18 RX ORDER — MORPHINE SULFATE 10 MG/ML
2 INJECTION, SOLUTION INTRAMUSCULAR; INTRAVENOUS
Status: DISCONTINUED | OUTPATIENT
Start: 2018-01-18 | End: 2018-01-20 | Stop reason: HOSPADM

## 2018-01-18 RX ORDER — BUPIVACAINE HYDROCHLORIDE AND EPINEPHRINE 5; 5 MG/ML; UG/ML
INJECTION, SOLUTION EPIDURAL; INTRACAUDAL; PERINEURAL AS NEEDED
Status: DISCONTINUED | OUTPATIENT
Start: 2018-01-18 | End: 2018-01-18 | Stop reason: HOSPADM

## 2018-01-18 RX ADMIN — FENTANYL CITRATE 25 MCG: 50 INJECTION, SOLUTION INTRAMUSCULAR; INTRAVENOUS at 11:14

## 2018-01-18 RX ADMIN — FENTANYL CITRATE 50 MCG: 50 INJECTION, SOLUTION INTRAMUSCULAR; INTRAVENOUS at 11:21

## 2018-01-18 RX ADMIN — MIDAZOLAM HYDROCHLORIDE 2 MG: 1 INJECTION, SOLUTION INTRAMUSCULAR; INTRAVENOUS at 10:24

## 2018-01-18 RX ADMIN — LIDOCAINE HYDROCHLORIDE 80 MG: 20 INJECTION, SOLUTION EPIDURAL; INFILTRATION; INTRACAUDAL; PERINEURAL at 10:31

## 2018-01-18 RX ADMIN — FENTANYL CITRATE 50 MCG: 50 INJECTION, SOLUTION INTRAMUSCULAR; INTRAVENOUS at 10:31

## 2018-01-18 RX ADMIN — ONDANSETRON 4 MG: 2 INJECTION INTRAMUSCULAR; INTRAVENOUS at 10:43

## 2018-01-18 RX ADMIN — PROPOFOL 200 MG: 10 INJECTION, EMULSION INTRAVENOUS at 10:31

## 2018-01-18 RX ADMIN — Medication 2 G: at 10:43

## 2018-01-18 RX ADMIN — EPHEDRINE SULFATE 10 MG: 50 INJECTION, SOLUTION INTRAVENOUS at 11:00

## 2018-01-18 RX ADMIN — SODIUM CHLORIDE, SODIUM LACTATE, POTASSIUM CHLORIDE, AND CALCIUM CHLORIDE 25 ML/HR: 600; 310; 30; 20 INJECTION, SOLUTION INTRAVENOUS at 08:41

## 2018-01-18 RX ADMIN — FENTANYL CITRATE 25 MCG: 50 INJECTION, SOLUTION INTRAMUSCULAR; INTRAVENOUS at 10:52

## 2018-01-18 RX ADMIN — DEXAMETHASONE SODIUM PHOSPHATE 4 MG: 4 INJECTION, SOLUTION INTRA-ARTICULAR; INTRALESIONAL; INTRAMUSCULAR; INTRAVENOUS; SOFT TISSUE at 10:43

## 2018-01-18 RX ADMIN — EPHEDRINE SULFATE 10 MG: 50 INJECTION, SOLUTION INTRAVENOUS at 11:04

## 2018-01-18 NOTE — DISCHARGE INSTRUCTIONS
[facility]    POST-OPERATIVE INSTRUCTIONS  Knee Arthroscopy    1. First meal at home should be clear liquids. Progress to regular diet as tolerated. 2. Apply an ice bag or use cold therapy device for 20-30 minutes 4 times a day for the first 48-72 hours to reduce swelling and pain and then use as desired. 3. You may remove the bulky dressing 24 hours after surgery and at that time it is ok to shower. Apply band-aids over the small incisions and change daily after showers. 4. Elevate your surgical leg when sitting or sleeping to reduce swelling. Do not place a pillow directly under the knee, place it under your ankle. 5. Exercises - practice quadricep tightening, straight leg raising exercises, and ankle pumps several times a day. Gradually work on bending your knee as tolerated. 6. Crutches will be provided if you do not already have them. Use crutches for the first 2 days and then you may fully weight bear with 2 crutches and progress off of them as you tolerate. 7. Let pain be your guide, too much pain, too much activity. 8. A prescription for pain medication will be provided for you on the day of your surgery. Please take one aspirin a day for 7 days beginning the day after your surgery. 9. If you develop a fever greater than 102, unexpected redness or swelling in your arm, please take Tylenol and call the office. Some bleeding and or drainage can be expected the first few days after surgery. 10. A post-op appointment should be scheduled for one week post-op, earlier as needed. Please call the office to schedule your appointment time ([phone]). Thank you for following the above instructions. If you have any questions, please call the office ([phone]).     ______________________________________________________________________    Anesthesia Discharge Instructions    After general anesthesia or intervenous sedation, for 24 hours or while taking prescription Narcotics:  · Limit your activities  · Do not drive or operate hazardous machinery  · If you have not urinated within 8 hours after discharge, please contact your surgeon on call. · Do not make important personal or business decisions  · Do not drink alcoholic beverages    Report the following to your surgeon:  · Excessive pain, swelling, redness or odor of or around the surgical area  · Temperature over 100.5 degrees  · Nausea and vomiting lasting longer than 4 hours or if unable to take medication  · Any signs of decreased circulation or nerve impairment to extremity:  Change in color, persistent numbness, tingling, coldness or increased pain.   · Any questions

## 2018-01-18 NOTE — IP AVS SNAPSHOT
2700 [facility] [address] [place] [address] 
[phone] Patient: Anu Cr MRN: [id number] HDT:[id number]/[id number] About your hospitalization You were admitted on:  January 18, 2018 You last received care in the:  [facility] PACU You were discharged on:  January 18, 2018 Why you were hospitalized Your primary diagnosis was:  Not on File Your diagnoses also included:  Tear Of Meniscus Of Right Knee Follow-up Information Follow up With Details Comments Contact Info Clarissa Purcell MD   [address] [address] [address] [address] 
[phone] Lynn Rivero MD Schedule an appointment as soon as possible for a visit in 1 week(s)  3898 [facility] 100 [address] [place] [address] 
[phone] Discharge Orders None A check regina indicates which time of day the medication should be taken. My Medications START taking these medications Instructions Each Dose to Equal  
 Morning Noon Evening Bedtime  
 oxyCODONE-acetaminophen 5-325 mg per tablet Commonly known as:  PERCOCET Your last dose was: Your next dose is: Take 1 Tab by mouth every four (4) hours as needed for Pain. Max Daily Amount: 6 Tabs. 1 Tab CONTINUE taking these medications Instructions Each Dose to Equal  
 Morning Noon Evening Bedtime Cholecalciferol (Vitamin D3) 2,000 unit Cap capsule Commonly known as:  VITAMIN D3 Your last dose was: Your next dose is: Take 2,000 Units by mouth daily. 2000 Units  
    
   
   
   
  
 empagliflozin 10 mg tablet Commonly known as:  Billi Kehr Your last dose was: Your next dose is: Take 10 mg by mouth daily. 10 mg  
    
   
   
   
  
 escitalopram oxalate 20 mg tablet Commonly known as:  Steve Prado Your last dose was: Your next dose is: Take 30 mg by mouth daily. Takes 30 mg daily 30 mg  
    
   
   
   
  
 EXCEDRIN PM  mg Tab Generic drug:  diphenhydrAMINE-acetaminophen Your last dose was: Your next dose is: Take 2 Tabs by mouth daily as needed. 2 Tab  
    
   
   
   
  
 felodipine 5 mg 24 hr tablet Commonly known as:  PLENDIL SR Your last dose was: Your next dose is: Take 5 mg by mouth daily. 5 mg HumaLOG 100 unit/mL injection Generic drug:  insulin lispro Your last dose was: Your next dose is:    
   
   
 10 Units by SubCUTAneous route two (2) times a day. LUNCH AND DINNER  
 10 Units Liraglutide 0.6 mg/0.1 mL (18 mg/3 mL) Pnij Commonly known as:  Ana Jayme Your last dose was: Your next dose is:    
   
   
 1.8 mg by SubCUTAneous route daily. 1.8 mg  
    
   
   
   
  
 quinapril 40 mg tablet Commonly known as:  ACCUPRIL Your last dose was: Your next dose is: Take 40 mg by mouth two (2) times a day. 40 mg  
    
   
   
   
  
 rosuvastatin 40 mg tablet Commonly known as:  CRESTOR Your last dose was: Your next dose is: Take 40 mg by mouth nightly. NON FORMULARY 40 mg SITagliptin-metFORMIN 50-1,000 mg per tablet Commonly known as:  Areli Pink Your last dose was: Your next dose is: Take 1 Tab by mouth two (2) times daily (with meals). 1 Tab TOUJEO SOLOSTAR 300 unit/mL (1.5 mL) Inpn Generic drug:  insulin glargine Your last dose was: Your next dose is:    
   
   
 80 Units by SubCUTAneous route two (2) times a day. MORNING AND BEDTIME. 80 Units Where to Get Your Medications Information on where to get these meds will be given to you by the nurse or doctor. ! Ask your nurse or doctor about these medications  
  oxyCODONE-acetaminophen 5-325 mg per tablet Discharge Instructions [address] POST-OPERATIVE INSTRUCTIONS Knee Arthroscopy 1. First meal at home should be clear liquids. Progress to regular diet as tolerated. 2. Apply an ice bag or use cold therapy device for 20-30 minutes 4 times a day for the first 48-72 hours to reduce swelling and pain and then use as desired. 3. You may remove the bulky dressing 24 hours after surgery and at that time it is ok to shower. Apply band-aids over the small incisions and change daily after showers. 4. Elevate your surgical leg when sitting or sleeping to reduce swelling. Do not place a pillow directly under the knee, place it under your ankle. 5. Exercises - practice quadricep tightening, straight leg raising exercises, and ankle pumps several times a day. Gradually work on bending your knee as tolerated. 6. Crutches will be provided if you do not already have them. Use crutches for the first 2 days and then you may fully weight bear with 2 crutches and progress off of them as you tolerate. 7. Let pain be your guide, too much pain, too much activity. 8. A prescription for pain medication will be provided for you on the day of your surgery. Please take one aspirin a day for 7 days beginning the day after your surgery. 9. If you develop a fever greater than 102, unexpected redness or swelling in your arm, please take Tylenol and call the office. Some bleeding and or drainage can be expected the first few days after surgery. 10. A post-op appointment should be scheduled for one week post-op, earlier as needed. Please call the office to schedule your appointment time ([phone]). Thank you for following the above instructions. If you have any questions, please call the office ([phone]). ______________________________________________________________________ Anesthesia Discharge Instructions After general anesthesia or intervenous sedation, for 24 hours or while taking prescription Narcotics: · Limit your activities · Do not drive or operate hazardous machinery · If you have not urinated within 8 hours after discharge, please contact your surgeon on call. · Do not make important personal or business decisions · Do not drink alcoholic beverages Report the following to your surgeon: 
· Excessive pain, swelling, redness or odor of or around the surgical area · Temperature over 100.5 degrees · Nausea and vomiting lasting longer than 4 hours or if unable to take medication · Any signs of decreased circulation or nerve impairment to extremity:  Change in color, persistent numbness, tingling, coldness or increased pain. · Any questions Introducing [facility]! Dear Guevara Cowart: 
Thank you for requesting a [employer] account. Our records indicate that you already have an active [employer] account. You can access your account anytime at [email or web address]. [email or web address] Did you know that you can access your hospital and ER discharge instructions at any time in [employer]? You can also review all of your test results from your hospital stay or ER visit. Additional Information If you have questions, please visit the Frequently Asked Questions section of the [employer] website at [email or web address] Remember, [employer] is NOT to be used for urgent needs. For medical emergencies, dial 911. Now available from your iPhone and Android! Providers Seen During Your Hospitalization Provider Specialty Primary office phone Nicole Moser MD Orthopedic Surgery [phone] Your Primary Care Physician (PCP) Primary Care Physician Office Phone Office Fax Mariluz Garcia [phone] You are allergic to the following No active allergies Recent Documentation Height Weight BMI Smoking Status 1.854 m 99.5 kg 28.94 kg/m2 Never Smoker Emergency Contacts Name Discharge Info Relation Home Work Mobile Rebeca Nichols DISCHARGE CAREGIVER [3] Spouse [3] [phone] Patient Belongings The following personal items are in your possession at time of discharge: 
  Dental Appliances: None  Visual Aid: Glasses      Home Medications: None   Jewelry: None  Clothing: At bedside (pt belongings bag)    Other Valuables: With patient (bilateral hearing aids) Discharge Instructions Attachments/References MEFS - OXYCODONE/ACETAMINOPHEN (PERCOCET, ROXICET) - (BY MOUTH) (ENGLISH) Patient Handouts Oxycodone/Acetaminophen (Percocet, Roxicet) - (By mouth) Why this medicine is used:  
Treats pain. This medicine contains a narcotic pain reliever. Contact a nurse or doctor right away if you have: 
· Extreme weakness, shallow breathing, slow heartbeat · Sweating or cold, clammy skin · Skin blisters, rash, or peeling Common side effects: 
· Constipation · Nausea, vomiting · Tiredness © 2017 [address] Information is for End User's use only and may not be sold, redistributed or otherwise used for commercial purposes. Please provide this summary of care documentation to your next provider. Signatures-by signing, you are acknowledging that this After Visit Summary has been reviewed with you and you have received a copy. Patient Signature:  ____________________________________________________________ Date:  ____________________________________________________________  
  
Henri Cifuentes Provider Signature:  ____________________________________________________________ Date:  ____________________________________________________________

## 2018-01-18 NOTE — BRIEF OP NOTE
BRIEF OPERATIVE NOTE    Date of Procedure: 1/18/2018   Preoperative Diagnosis: RIGHT KNEE MENISCUS TEAR   Postoperative Diagnosis: RIGHT KNEE MENISCUS TEAR     Procedure(s):  RIGHT KNEE ARTHROSCOPY;  MEDIAL MENISCECTOMY: LATERAL RELEASE; LATERAL AND PATELLOFEMORAL CHONDROPLASTY  Surgeon(s) and Role:     * Kee Ojeda MD - Primary         Assistant Staff:  Physician Assistant: Rolf Santiago PA-C    Surgical Staff:  Circ-1: Alisson Kelley  Physician Assistant: Rolf Santiago PA-C  Scrub Tech-1: Joseph Strong  Scrub Tech-Relief: Daniel Beard  Event Time In   Incision Start 1054   Incision Close 1119     Anesthesia: General   Estimated Blood Loss: Minimal  Specimens: * No specimens in log *   Findings: Right knee meniscus tear   Complications: None.   Implants: * No implants in log *

## 2018-01-18 NOTE — ROUTINE PROCESS
Patient: Richar Fuentes MRN: [id number]  SSN: [id number]   YOB: 1955  Age: 60 y.o. Sex: male     Patient is status post Procedure(s):  RIGHT KNEE ARTHROSCOPY;  MEDIAL MENISCECTOMY: LATERAL RELEASE; LATERAL AND PATELLOFEMORAL CHONDROPLASTY. Surgeon(s) and Role:     * Robbie Loja MD - Primary    Local/Dose/Irrigation:  MARCAINE 0.5% WITH EPI 30 ML                  Peripheral IV 01/18/18 Left Hand (Active)   Site Assessment Clean, dry, & intact 1/18/2018  8:40 AM   Phlebitis Assessment 0 1/18/2018  8:40 AM   Infiltration Assessment 0 1/18/2018  8:40 AM   Dressing Status Clean, dry, & intact; New 1/18/2018  8:40 AM   Dressing Type Tape;Transparent 1/18/2018  8:40 AM   Hub Color/Line Status Green 1/18/2018  8:40 AM            Airway - Endotracheal Tube 01/18/18 Oral (Active)                   Dressing/Packing:  Wound Knee Right-DRESSING TYPE: Cast padding;Elastic bandage (01/18/18 1125)  Splint/Cast:  ]    Other:

## 2018-01-19 NOTE — ANESTHESIA POSTPROCEDURE EVALUATION
Post-Anesthesia Evaluation and Assessment    Patient: Sherri Goetz MRN: [id number]  SSN: [id number]    YOB: 1955  Age: 60 y.o. Sex: male       Cardiovascular Function/Vital Signs  Visit Vitals    /75    Pulse 84    Temp 36.7 °C (98 °F)    Resp 15    Ht 6' 1\" (1.854 m)    Wt 99.5 kg (219 lb 6 oz)    SpO2 92%    BMI 28.94 kg/m2       Patient is status post general anesthesia for Procedure(s):  RIGHT KNEE ARTHROSCOPY;  MEDIAL MENISCECTOMY: LATERAL RELEASE; LATERAL AND PATELLOFEMORAL CHONDROPLASTY. Nausea/Vomiting: None    Postoperative hydration reviewed and adequate. Pain:  Pain Scale 1: Numeric (0 - 10) (01/18/18 1338)  Pain Intensity 1: 0 (01/18/18 1338)   Managed    Neurological Status:   Neuro (WDL): Within Defined Limits (01/18/18 1338)  Neuro  Neurologic State: Alert;Drowsy (01/18/18 1223)  Orientation Level: Oriented X4 (01/18/18 1223)  Cognition: Follows commands (01/18/18 1223)  Speech: Clear (01/18/18 1223)  LUE Motor Response: Purposeful (01/18/18 1223)  LLE Motor Response: Purposeful (01/18/18 1223)  RUE Motor Response: Purposeful (01/18/18 1223)  RLE Motor Response: Purposeful (01/18/18 1223)   At baseline    Mental Status and Level of Consciousness: Arousable    Pulmonary Status:   O2 Device: Room air (01/18/18 1338)   Adequate oxygenation and airway patent    Complications related to anesthesia: None    Post-anesthesia assessment completed.  No concerns    Signed By: Estefany Ch MD     January 19, 2018

## 2018-01-19 NOTE — OP NOTES
500 [facility] OP NOTE    Donte Finch  MR#: [id number]  : 1955  ACCOUNT #: [de-identified]   DATE OF SERVICE: 2018    DATE OF SURGERY:  2018. SURGEON:  Keya Cooper MD.    ASSISTANTS:  None. PREOPERATIVE DIAGNOSES:  1. Right knee chondromalacia. 2.  Meniscus tear. 3.  Patellofemoral syndrome with lateral-tracking patella. POSTOPERATIVE DIAGNOSES:  1. Right knee chondromalacia. 2.  Meniscus tear. 3.  Patellofemoral syndrome with lateral-tracking patella. PROCEDURES PERFORMED:    1. Right knee arthroscopy with partial medial meniscectomy. 2.  Lateral and patellofemoral chondroplasty. 3.  Lateral retinacular release. ANESTHESIA:  General.    ESTIMATED BLOOD LOSS:  Minimal.    DRAINS:  None. COMPLICATIONS:  None. SPECIMENS REMOVED:  None. IMPLANTS:  None. INDICATIONS:  This patient has right knee pain. He has meniscal and chondral pathology. He also has a lateral tracking patella and now presents for the above procedure. He understands the procedure and all potential complications. He understands that I cannot predict the degree to which he would note improvement. DESCRIPTION OF THE PROCEDURE:  On date of operation, the patient was taken to the operating room and placed in supine position. General anesthesia administered. The right lower extremity was prepped and draped in the usual fashion. After exsanguination with an Esmarch, tourniquet inflated to 300 mmHg. A lateral puncture was made for introduction of the arthroscope. The medial compartment was evaluated. There is a flap tear of the anterior horn of the medial meniscus. Combination of punches and roe were used to perform a partial medial meniscectomy. The anterior cruciate ligament was probed and felt to be intact.       The lateral meniscus was intact without evidence of tear, but there was moderate chondromalacia of the lateral compartment. A shaver introduced and chondroplasty performed. The patellofemoral joint had marked chondromalacia with lateral tracking patella. The shaver was introduced and chondroplasty performed. It was felt that lateral release would be beneficial.  Electrocautery was introduced and lateral retinacular release performed. This provided improvement in tracking of the patella. The saline and instruments were removed. Portals were closed with 3-0 nylon. The knee was injected with Marcaine plus epinephrine. Sterile compressive dressings were applied. Patient taken to recovery room in satisfactory condition. MD BJ Morales / WILLIAM  D: 01/18/2018 20:18     T: 01/18/2018 23:21  JOB #: [id number]

## 2018-01-22 NOTE — DISCHARGE SUMMARY
Discharge Summary    Physician: Carles Bloch, MD    Physician Assistant: Yo Jama PA-C    Admit Diagnosis:  RIGHT KNEE MENISCUS TEAR     Final Diagnosis:   1. Right knee chondromalacia. 2.  Meniscus tear. 3.  Patellofemoral syndrome with lateral-tracking patella.     Procedure: Right knee arthroscopy with partial medial meniscectomy, Lateral and patellofemoral chondroplasty and Lateral retinacular release     Complications: None. History of Present Illness: The patient has a history of pain within the right knee . The patient has a right knee meniscus tear and patellofemoral maltracking and has exhausted conservative therapy at this time. The patient presents for the above-mentioned procedure. The patient has been cleared from a medical and cardiac standpoint. Past Medical History:  Recorded in the chart. Physical Examination: Also recorded in the chart. Examination of the right knee reveals no sign of infection. No effusion. No ecchymosis or erythema. No cellulitis or rash. No calf pain, no evidence of DVT. The extensor mechanism is intact. The neurovascular status is intact. [facility] MRI reveals 1. Post surgical changes in the lateral meniscus. 2. Evidence of a lateral patellar tracking abnormality with significant chondromalacia in the lateral patellar facet. Course in the Hospital:  The patient was admitted to the hospital.  The Pt. Underwent a Right knee arthroscopy with partial medial meniscectomy, Lateral and patellofemoral chondroplasty and Lateral retinacular release. Postoperatively, the pt. did well. The pt. Remained stable from a medical standpoint. At the time of discharge, the patient's right knee incision appeared with sutures/steri-strips intact. No signs of infection or inflammation noted. The patient will follow up in our office in one week post-operatively.     DC med list:  Discharge Medication List as of 1/18/2018  1:46 PM      START taking these medications    Details oxyCODONE-acetaminophen (PERCOCET) 5-325 mg per tablet Take 1 Tab by mouth every four (4) hours as needed for Pain. Max Daily Amount: 6 Tabs., Print, Disp-42 Tab, R-0         CONTINUE these medications which have NOT CHANGED    Details   insulin lispro (HUMALOG) 100 unit/mL injection 10 Units by SubCUTAneous route two (2) times a day. LUNCH AND DINNER, Historical Med      insulin glargine (TOUJEO SOLOSTAR) 300 unit/mL (1.5 mL) inpn 80 Units by SubCUTAneous route two (2) times a day. MORNING AND BEDTIME., Historical Med      sitaGLIPtin-metFORMIN (JANUMET) 50-1,000 mg per tablet Take 1 Tab by mouth two (2) times daily (with meals). , Historical Med      empagliflozin (JARDIANCE) 10 mg tablet Take 10 mg by mouth daily. , Historical Med      Liraglutide (VICTOZA) 0.6 mg/0.1 mL (18 mg/3 mL) sub-q pen 1.8 mg by SubCUTAneous route daily. , Historical Med      felodipine (PLENDIL SR) 5 mg 24 hr tablet Take 5 mg by mouth daily. , Historical Med      diphenhydramine-acetaminophen (EXCEDRIN PM)  mg Tab Take 2 Tabs by mouth daily as needed., Historical Med      Cholecalciferol, Vitamin D3, 2,000 unit cap Take 2,000 Units by mouth daily. , Historical Med      escitalopram (LEXAPRO) 20 mg tablet Take 30 mg by mouth daily. Takes 30 mg daily, Historical Med      quinapril (ACCUPRIL) 40 mg tablet Take 40 mg by mouth two (2) times a day., Historical Med      rosuvastatin (CRESTOR) 40 mg tablet Take 40 mg by mouth nightly.  NON FORMULARY , Historical Med             Patient Disposition: Home  Followup Care:  Discharge Condition: good  Activity as tolerated with initial use of crutches  Regular Diet  As directed    Follow-up Information     Follow up With Details Comments MD Sandhya   [address] [facility]  [address] [place] [address]      Hilario Ragsdale MD Schedule an appointment as soon as possible for a visit in 1 week(s)  [address]  [address] [facility] [address]  [phone] Deb Neal PA-C

## 2018-08-29 ENCOUNTER — HOSPITAL ENCOUNTER (EMERGENCY)
Age: 63
Discharge: HOME | End: 2018-08-30
Attending: EMERGENCY MEDICINE | Admitting: INTERNAL MEDICINE
Payer: COMMERCIAL

## 2018-08-29 ENCOUNTER — APPOINTMENT (OUTPATIENT)
Dept: GENERAL RADIOLOGY | Age: 63
End: 2018-08-29
Attending: EMERGENCY MEDICINE
Payer: COMMERCIAL

## 2018-08-29 DIAGNOSIS — K22.2 ESOPHAGEAL OBSTRUCTION DUE TO FOOD IMPACTION: Primary | ICD-10-CM

## 2018-08-29 DIAGNOSIS — T18.128A ESOPHAGEAL OBSTRUCTION DUE TO FOOD IMPACTION: Primary | ICD-10-CM

## 2018-08-29 LAB
ALBUMIN SERPL-MCNC: 4 G/DL (ref 3.5–5)
ALBUMIN/GLOB SERPL: 1 {RATIO} (ref 1.1–2.2)
ALP SERPL-CCNC: 94 U/L (ref 45–117)
ALT SERPL-CCNC: 66 U/L (ref 12–78)
ANION GAP SERPL CALC-SCNC: 10 MMOL/L (ref 5–15)
AST SERPL-CCNC: 46 U/L (ref 15–37)
BASOPHILS # BLD: 0.1 K/UL (ref 0–0.1)
BASOPHILS NFR BLD: 1 % (ref 0–1)
BILIRUB SERPL-MCNC: 0.3 MG/DL (ref 0.2–1)
BUN SERPL-MCNC: 9 MG/DL (ref 6–20)
BUN/CREAT SERPL: 9 (ref 12–20)
CALCIUM SERPL-MCNC: 9.2 MG/DL (ref 8.5–10.1)
CHLORIDE SERPL-SCNC: 104 MMOL/L (ref 97–108)
CO2 SERPL-SCNC: 27 MMOL/L (ref 21–32)
CREAT SERPL-MCNC: 1.02 MG/DL (ref 0.7–1.3)
DIFFERENTIAL METHOD BLD: ABNORMAL
EOSINOPHIL # BLD: 0.5 K/UL (ref 0–0.4)
EOSINOPHIL NFR BLD: 8 % (ref 0–7)
ERYTHROCYTE [DISTWIDTH] IN BLOOD BY AUTOMATED COUNT: 13.5 % (ref 11.5–14.5)
GLOBULIN SER CALC-MCNC: 4.2 G/DL (ref 2–4)
GLUCOSE BLD STRIP.AUTO-MCNC: 126 MG/DL (ref 65–100)
GLUCOSE SERPL-MCNC: 127 MG/DL (ref 65–100)
HCT VFR BLD AUTO: 46.4 % (ref 36.6–50.3)
HGB BLD-MCNC: 15.6 G/DL (ref 12.1–17)
IMM GRANULOCYTES # BLD: 0 K/UL (ref 0–0.04)
IMM GRANULOCYTES NFR BLD AUTO: 1 % (ref 0–0.5)
LYMPHOCYTES # BLD: 1.6 K/UL (ref 0.8–3.5)
LYMPHOCYTES NFR BLD: 25 % (ref 12–49)
MCH RBC QN AUTO: 30.6 PG (ref 26–34)
MCHC RBC AUTO-ENTMCNC: 33.6 G/DL (ref 30–36.5)
MCV RBC AUTO: 91.2 FL (ref 80–99)
MONOCYTES # BLD: 0.5 K/UL (ref 0–1)
MONOCYTES NFR BLD: 8 % (ref 5–13)
NEUTS SEG # BLD: 3.6 K/UL (ref 1.8–8)
NEUTS SEG NFR BLD: 57 % (ref 32–75)
NRBC # BLD: 0 K/UL (ref 0–0.01)
NRBC BLD-RTO: 0 PER 100 WBC
PLATELET # BLD AUTO: 151 K/UL (ref 150–400)
PMV BLD AUTO: 9.5 FL (ref 8.9–12.9)
POTASSIUM SERPL-SCNC: 3.9 MMOL/L (ref 3.5–5.1)
PROT SERPL-MCNC: 8.2 G/DL (ref 6.4–8.2)
RBC # BLD AUTO: 5.09 M/UL (ref 4.1–5.7)
SERVICE CMNT-IMP: ABNORMAL
SODIUM SERPL-SCNC: 141 MMOL/L (ref 136–145)
WBC # BLD AUTO: 6.3 K/UL (ref 4.1–11.1)

## 2018-08-29 PROCEDURE — 85025 COMPLETE CBC W/AUTO DIFF WBC: CPT | Performed by: EMERGENCY MEDICINE

## 2018-08-29 PROCEDURE — 74011250636 HC RX REV CODE- 250/636: Performed by: EMERGENCY MEDICINE

## 2018-08-29 PROCEDURE — 36415 COLL VENOUS BLD VENIPUNCTURE: CPT | Performed by: EMERGENCY MEDICINE

## 2018-08-29 PROCEDURE — 76040000019: Performed by: INTERNAL MEDICINE

## 2018-08-29 PROCEDURE — 71045 X-RAY EXAM CHEST 1 VIEW: CPT

## 2018-08-29 PROCEDURE — 96374 THER/PROPH/DIAG INJ IV PUSH: CPT

## 2018-08-29 PROCEDURE — 80053 COMPREHEN METABOLIC PANEL: CPT | Performed by: EMERGENCY MEDICINE

## 2018-08-29 PROCEDURE — 99285 EMERGENCY DEPT VISIT HI MDM: CPT

## 2018-08-29 PROCEDURE — 96361 HYDRATE IV INFUSION ADD-ON: CPT

## 2018-08-29 PROCEDURE — 82962 GLUCOSE BLOOD TEST: CPT

## 2018-08-29 RX ORDER — SODIUM CHLORIDE 9 MG/ML
125 INJECTION, SOLUTION INTRAVENOUS CONTINUOUS
Status: DISCONTINUED | OUTPATIENT
Start: 2018-08-29 | End: 2018-08-30 | Stop reason: HOSPADM

## 2018-08-29 RX ADMIN — SODIUM CHLORIDE 125 ML/HR: 900 INJECTION, SOLUTION INTRAVENOUS at 22:29

## 2018-08-29 RX ADMIN — GLUCAGON HYDROCHLORIDE 1 MG: 1 INJECTION, POWDER, FOR SOLUTION INTRAMUSCULAR; INTRAVENOUS; SUBCUTANEOUS at 22:22

## 2018-08-30 VITALS
HEIGHT: 73 IN | HEART RATE: 98 BPM | BODY MASS INDEX: 29.16 KG/M2 | RESPIRATION RATE: 18 BRPM | TEMPERATURE: 99.2 F | SYSTOLIC BLOOD PRESSURE: 99 MMHG | DIASTOLIC BLOOD PRESSURE: 68 MMHG | OXYGEN SATURATION: 95 % | WEIGHT: 220 LBS

## 2018-08-30 LAB
GLUCOSE BLD STRIP.AUTO-MCNC: 101 MG/DL (ref 65–100)
SERVICE CMNT-IMP: ABNORMAL

## 2018-08-30 PROCEDURE — 77010033678 HC OXYGEN DAILY

## 2018-08-30 PROCEDURE — 74011250636 HC RX REV CODE- 250/636: Performed by: INTERNAL MEDICINE

## 2018-08-30 PROCEDURE — 76040000019: Performed by: INTERNAL MEDICINE

## 2018-08-30 PROCEDURE — 96361 HYDRATE IV INFUSION ADD-ON: CPT

## 2018-08-30 PROCEDURE — 82962 GLUCOSE BLOOD TEST: CPT

## 2018-08-30 PROCEDURE — 74011250636 HC RX REV CODE- 250/636

## 2018-08-30 RX ORDER — FENTANYL CITRATE 50 UG/ML
INJECTION, SOLUTION INTRAMUSCULAR; INTRAVENOUS
Status: COMPLETED
Start: 2018-08-30 | End: 2018-08-30

## 2018-08-30 RX ORDER — SODIUM CHLORIDE 0.9 % (FLUSH) 0.9 %
5-10 SYRINGE (ML) INJECTION EVERY 8 HOURS
Status: DISCONTINUED | OUTPATIENT
Start: 2018-08-30 | End: 2018-08-30 | Stop reason: HOSPADM

## 2018-08-30 RX ORDER — EPINEPHRINE 0.1 MG/ML
1 INJECTION INTRACARDIAC; INTRAVENOUS
Status: DISCONTINUED | OUTPATIENT
Start: 2018-08-30 | End: 2018-08-30 | Stop reason: HOSPADM

## 2018-08-30 RX ORDER — MIDAZOLAM HYDROCHLORIDE 1 MG/ML
INJECTION, SOLUTION INTRAMUSCULAR; INTRAVENOUS
Status: COMPLETED
Start: 2018-08-30 | End: 2018-08-30

## 2018-08-30 RX ORDER — NALOXONE HYDROCHLORIDE 0.4 MG/ML
0.4 INJECTION, SOLUTION INTRAMUSCULAR; INTRAVENOUS; SUBCUTANEOUS
Status: ACTIVE | OUTPATIENT
Start: 2018-08-30 | End: 2018-08-30

## 2018-08-30 RX ORDER — DIPHENHYDRAMINE HYDROCHLORIDE 50 MG/ML
50 INJECTION, SOLUTION INTRAMUSCULAR; INTRAVENOUS ONCE
Status: COMPLETED | OUTPATIENT
Start: 2018-08-30 | End: 2018-08-30

## 2018-08-30 RX ORDER — SODIUM CHLORIDE 9 MG/ML
75 INJECTION, SOLUTION INTRAVENOUS CONTINUOUS
Status: DISCONTINUED | OUTPATIENT
Start: 2018-08-30 | End: 2018-08-30 | Stop reason: HOSPADM

## 2018-08-30 RX ORDER — MIDAZOLAM HYDROCHLORIDE 1 MG/ML
10 INJECTION, SOLUTION INTRAMUSCULAR; INTRAVENOUS ONCE
Status: COMPLETED | OUTPATIENT
Start: 2018-08-30 | End: 2018-08-30

## 2018-08-30 RX ORDER — FENTANYL CITRATE 50 UG/ML
150 INJECTION, SOLUTION INTRAMUSCULAR; INTRAVENOUS ONCE
Status: COMPLETED | OUTPATIENT
Start: 2018-08-30 | End: 2018-08-30

## 2018-08-30 RX ORDER — SODIUM CHLORIDE 0.9 % (FLUSH) 0.9 %
5-10 SYRINGE (ML) INJECTION AS NEEDED
Status: DISPENSED | OUTPATIENT
Start: 2018-08-30 | End: 2018-08-30

## 2018-08-30 RX ORDER — DEXTROMETHORPHAN/PSEUDOEPHED 2.5-7.5/.8
1.2 DROPS ORAL
Status: DISCONTINUED | OUTPATIENT
Start: 2018-08-30 | End: 2018-08-30 | Stop reason: HOSPADM

## 2018-08-30 RX ORDER — ATROPINE SULFATE 0.1 MG/ML
0.5 INJECTION INTRAVENOUS
Status: DISCONTINUED | OUTPATIENT
Start: 2018-08-30 | End: 2018-08-30 | Stop reason: HOSPADM

## 2018-08-30 RX ORDER — FLUMAZENIL 0.1 MG/ML
0.2 INJECTION INTRAVENOUS
Status: ACTIVE | OUTPATIENT
Start: 2018-08-30 | End: 2018-08-30

## 2018-08-30 RX ADMIN — MIDAZOLAM HYDROCHLORIDE 2 MG: 1 INJECTION, SOLUTION INTRAMUSCULAR; INTRAVENOUS at 00:12

## 2018-08-30 RX ADMIN — FENTANYL CITRATE 50 MCG: 50 INJECTION, SOLUTION INTRAMUSCULAR; INTRAVENOUS at 00:14

## 2018-08-30 RX ADMIN — FENTANYL CITRATE 50 MCG: 50 INJECTION, SOLUTION INTRAMUSCULAR; INTRAVENOUS at 00:12

## 2018-08-30 RX ADMIN — DIPHENHYDRAMINE HYDROCHLORIDE 25 MG: 50 INJECTION, SOLUTION INTRAMUSCULAR; INTRAVENOUS at 00:16

## 2018-08-30 RX ADMIN — DIPHENHYDRAMINE HYDROCHLORIDE 25 MG: 50 INJECTION, SOLUTION INTRAMUSCULAR; INTRAVENOUS at 00:12

## 2018-08-30 RX ADMIN — MIDAZOLAM HYDROCHLORIDE 1 MG: 1 INJECTION, SOLUTION INTRAMUSCULAR; INTRAVENOUS at 00:14

## 2018-08-30 NOTE — ED NOTES
MD reviewed discharge instructions and options with patient and patient verbalized understanding. RN reviewed discharge instructions using teachback method. Pt left ED in no signs of acute distress escorted by wife, and wife  will drive home. No complaints or needs expressed at this time. Patient was counseled on medications prescribed at discharge. VSS at time of discharge. Pt to call PCP in the morning for follow up.

## 2018-08-30 NOTE — DISCHARGE INSTRUCTIONS
Esophageal Dilation: What to Expect at [address] [place] [address]  After you have esophageal dilation, you will stay at the hospital or surgery center for 1 to 2 hours. This will allow the medicine to wear off. You will be able to go home after your doctor or nurse checks to make sure you are not having any problems. This care sheet gives you a general idea about how long it will take for you to recover. But each person recovers at a different pace. Follow the steps below to get better as quickly as possible. How can you care for yourself at home? Activity    · Rest as much as you need to after you go home.     · You should be able to go back to your usual activities the day after the procedure. Diet    · Follow your doctor's directions for eating after the procedure.     · Drink plenty of fluids (unless your doctor has told you not to). Medicines    · Your doctor will tell you if and when you can restart your medicines. He or she will also give you instructions about taking any new medicines.     · If you take blood thinners, such as warfarin (Coumadin), clopidogrel (Plavix), or aspirin, be sure to talk to your doctor. He or she will tell you if and when to start taking those medicines again. Make sure that you understand exactly what your doctor wants you to do.     · If you have a sore throat the day after the procedure, use an over-the-counter spray to numb your throat. Sucking on throat lozenges and gargling with warm salt water may also help relieve your symptoms. Follow-up care is a key part of your treatment and safety. Be sure to make and go to all appointments, and call your doctor if you are having problems. It's also a good idea to know your test results and keep a list of the medicines you take. When should you call for help? Call 911 anytime you think you may need emergency care.  For example, call if:    · You passed out (lost consciousness).     · You have trouble breathing.     · Your stools are maroon or very bloody    Call your doctor now or seek immediate medical care if:    · You have new or worse belly pain.     · You have a fever.     · You have new or more blood in your stools.     · You are sick to your stomach and cannot drink fluids.     · You cannot pass stools or gas.     · You have pain that does not get better after you take pain medicine.    Watch closely for changes in your health, and be sure to contact your doctor if:    · Your throat still hurts after a day or two.     · You do not get better as expected. Where can you learn more? Go to http://apollo-tavo.info/. Enter U586 in the search box to learn more about \"Esophageal Dilation: What to Expect at Home. \"  Current as of: May 12, 2017  Content Version: 11.7  © 8823-4141 [employer]. Care instructions adapted under license by [employer] (which disclaims liability or warranty for this information). If you have questions about a medical condition or this instruction, always ask your healthcare professional. [address] any warranty or liability for your use of this information.

## 2018-08-30 NOTE — ED NOTES
Pt able to ambulate x 2 assist from room 11 to room 3/4. Pt feet shuffled, periodic breaks for feeling too tired. Pt returned to room in wheelchair to rest more before going home. MD made aware.

## 2018-08-30 NOTE — ED TRIAGE NOTES
Triage:  Pt to ED due to concerns over a food bolus. Pt states was out to dinner this evening and soon after eating the meal developed the sensation like food stuck mid to lower esophagus/throat. Pt states since onset has not been able to tolerate PO intake and swallow, Pt states discomfort to area.

## 2018-08-30 NOTE — ED NOTES
Bedside shift change report given to Vivi Rubio RN (oncoming nurse) by Iliana Sandoval RN and Dana Rincon, student nurse (offgoing nurse). Report included the following information SBAR, ED Summary, MAR and Recent Results.

## 2018-08-30 NOTE — PROGRESS NOTES
TRANSFER - OUT REPORT: 
 
Verbal report given to ER RN(name) on Yao Nichols  Report consisted of patients Situation, Background, Assessment and  
Recommendations(SBAR). Information from the following report(s) SBAR, Procedure Summary, Intake/Output and MAR was reviewed with the receiving nurse. Lines:  
Peripheral IV 08/29/18 Right Antecubital (Active) Site Assessment Clean, dry, & intact 8/29/2018 11:43 PM  
Phlebitis Assessment 0 8/29/2018 11:43 PM  
Infiltration Assessment 0 8/29/2018 11:43 PM  
Dressing Status Clean, dry, & intact 8/29/2018 11:43 PM  
Dressing Type Transparent 8/29/2018 11:43 PM  
Hub Color/Line Status Pink;Patent; Flushed 8/29/2018 11:43 PM  
Action Taken Blood drawn; Wrapped 8/29/2018 11:43 PM  
  
 
Opportunity for questions and clarification was provided.

## 2018-08-30 NOTE — ED NOTES
Pt noted to be shaking, per wife, pt sometimes shakes this way. MD at bedside. Dysphagia screening normal. Pt stood by bed, took a few steps, and walked approximately 3 feet to bedside chair. Pt reports feeling unsteady at this time. Pt's wife reports pt uses a cane at home. Pt given OJ and water at bedside. MD made aware.

## 2018-08-30 NOTE — ED NOTES
Pt resting comfortably in bed. Wife returned to room and updated on pt condition and plan of care. Pt and wife have no further questions at this time. VSS. Call bell within reach. Will continue to monitor and assess.

## 2018-08-30 NOTE — ED PROVIDER NOTES
Patient is a 58 y.o. male presenting with difficulty swallowing. Dysphagia Associated symptoms include congestion, shortness of breath and cough. Pertinent negatives include no vomiting and no headaches. 58year old male with history of barrets esophagus, last stretched about 2-3 months ago, eating steak and shrimp around 6pm and felt food get stuck. Hasn't been able to swallow his own saliva since. Has had this many many times before and usually can get it up himself. Has had a bad cough for several months, vomitnig over the weekend due to cough. Not on antibiotics. Sugars running ok- diabetic. Past Medical History:  
Diagnosis Date  Arrhythmia   
 R BUNDLE BRANCH BLOCK  Arthritis  Diabetes ([facility] [address] TYPE II, INSULIN DEPENDANT  GERD (gastroesophageal reflux disease) MARCUS'S ESOPHAGUS  
 Hypertension  Liver disease FATTY LIVER  
 Other ill-defined conditions(799.89) High Cholesterol  Other ill-defined conditions(799.89) IRON DEFICIENT ANEMIA  Psychiatric disorder Depression  Unspecified sleep apnea CPAP Past Surgical History:  
Procedure Laterality Date  HX GI  02/2007 COLONOSCOPY  
 HX GI  2017 ENDOSCOPY, UPPER  
 HX HEENT  2002 EYELID REDUCTION  
 HX KNEE ARTHROSCOPY Left 2014 MENISCUS REPAIR  
 HX KNEE ARTHROSCOPY Right 2014  HX LAP CHOLECYSTECTOMY  2007  HX OTHER SURGICAL Right 2012 LIPOMA EXCISION UPPER ABD Family History:  
Problem Relation Age of Onset  Hypertension Mother Trev.Paganini Arthritis-osteo Mother  Alzheimer Father  Lung Disease Father  Parkinson's Disease Father  No Known Problems Sister  No Known Problems Sister  Anesth Problems Neg Hx Social History Social History  Marital status:  Spouse name: N/A  
 Number of children: N/A  
 Years of education: N/A Occupational History  Not on file. Social History Main Topics  Smoking status: Never Smoker  Smokeless tobacco: Never Used  Alcohol use 1.2 oz/week 2 Cans of beer per week  Drug use: No  
 Sexual activity: Not on file Other Topics Concern  Not on file Social History Narrative ALLERGIES: Review of patient's allergies indicates no known allergies. Review of Systems Constitutional: Negative for fever. HENT: Positive for congestion. Eyes: Negative for visual disturbance. Respiratory: Positive for cough and shortness of breath. Cardiovascular: Negative for chest pain. Gastrointestinal: Negative for abdominal pain, nausea and vomiting. Endocrine: Negative for polyuria. Genitourinary: Negative for dysuria. Musculoskeletal: Negative for gait problem. Skin: Negative for rash. Neurological: Negative for headaches. Psychiatric/Behavioral: Negative for dysphoric mood. Vitals:  
 08/29/18 2118 BP: (!) 166/132 Pulse: (!) 104 Resp: 22 Temp: 98.7 °F (37.1 °C) SpO2: 94% Weight: 99.8 kg (220 lb) Height: 6' 1\" (1.854 m) Physical Exam  
Constitutional: He is oriented to person, place, and time. He appears well-developed and well-nourished. No distress. Appears uncomfortable HENT:  
Head: Normocephalic and atraumatic. Mouth/Throat: Oropharynx is clear and moist. No oropharyngeal exudate. Spitting into emesis basin Eyes: Conjunctivae and EOM are normal. Pupils are equal, round, and reactive to light. Right eye exhibits no discharge. Left eye exhibits no discharge. No scleral icterus. Neck: Normal range of motion. Neck supple. No JVD present. Cardiovascular: Normal rate, regular rhythm, normal heart sounds and intact distal pulses. Exam reveals no gallop and no friction rub. No murmur heard. Pulmonary/Chest: Effort normal and breath sounds normal. No stridor. No respiratory distress. He has no wheezes. He has no rales. He exhibits no tenderness. Abdominal: Soft. Bowel sounds are normal. He exhibits no distension and no mass. There is no tenderness. There is no rebound and no guarding. Musculoskeletal: Normal range of motion. He exhibits no edema or tenderness. Neurological: He is alert and oriented to person, place, and time. He has normal reflexes. No cranial nerve deficit. He exhibits normal muscle tone. Coordination normal.  
Skin: Skin is warm and dry. No rash noted. No erythema. Psychiatric: He has a normal mood and affect. His behavior is normal. Judgment and thought content normal.  
  
 
[facility] 
 
 
ED Course Procedures Spoke with Dr. Nadya Patterson- GI. He is on his way in. Ramon Torres MD 
11:46 PM 
GI here for endoscopy. Dr. Nadya Patterson was able to pass food bolus in to stomach. Once able to ambulate independently and tolerate po's post sedation, will d/c with  Instructions per Dyan Ortiz.

## 2018-08-30 NOTE — PROCEDURES
NAME:  Cody Hurst   :   1955   MRN:   [id number]     Date/Time:  2018 12:24 AM    Esophagogastroduodenoscopy (EGD) Procedure Note    Procedure: Esophagogastroduodenoscopy with foreign body removal    Indication:  Dysphagia/odynophagia (Food impaction)  Pre-operative Diagnosis: see indication above  Post-operative Diagnosis: see findings below  :  Dulce Pierce MD  Referring Provider:   -Joshua Tidwell MD-Sandeep Atwood MD    Exam:  Airway: clear, no airway problems anticipated  Heart: RRR, without gallops or rubs  Lungs: clear bilaterally without wheezes, crackles, or rhonchi  Abdomen: soft, nontender, nondistended, bowel sounds present  Mental Status: awake, alert and oriented to person, place and time     Anethesia/Sedation:  Versed 3 mg IV, Fentanyl 75 mcg, Benadryl 50 mg  Procedure Details   After informed consent was obtained for the procedure, with all risks and benefits of procedure explained the patient was taken to the endoscopy suite and placed in the left lateral decubitus position. Following sequential administration of sedation as per above, the [id number] gastroscope was inserted into the mouth and advanced under direct vision to second portion of the duodenum. A careful inspection was made as the gastroscope was withdrawn, including a retroflexed view of the proximal stomach; findings and interventions are described below. Findings:   1. Food impaction in proximal esophagus. The tip of the endoscope was used to gently push the bolus into the stomach  2. Moderate to severe diffuse trachealization of esophagus consistent with EoE  3. Short segment Mckay's  4. Normal stomach  5. Normal duodenum    Therapies:  Food bolus removal    Specimens: None    EBL:  None. Complications:   None; patient tolerated the procedure well. Impression:    1. Food impaction in proximal esophagus.  The tip of the endoscope was used to gently push the bolus into the stomach  2. Moderate to severe diffuse trachealization of esophagus consistent with EoE  3. Short segment Mckay's  4. Normal stomach  5. Normal duodenum    Recommendations:  1. BID PPI  2.  Follow up with Dr. Dion Frankel; pt likely needs swallowed Budesonide inh    Discharge disposition:  Home in the company of  when able to ambulate    Romain Conte MD

## 2018-08-30 NOTE — ED NOTES
Pt not able to tolerate fizz ease. Attempted to drink solution 3 times and immediately spit back up each time. MD aware.

## 2018-08-30 NOTE — PROGRESS NOTES
Brief GI note: 
 
Pt w/ h/o EoE with food impaction with steak/shrimp. Plan for urgent EGD. All questions answered and pt agrees with proceeding Signed By: Jenniffer Tabor MD   
 August 30, 2018

## 2020-02-27 RX ORDER — GABAPENTIN 300 MG/1
300 CAPSULE ORAL 3 TIMES DAILY
COMMUNITY
End: 2022-06-24

## 2020-02-27 RX ORDER — FAMOTIDINE 20 MG/1
20 TABLET, FILM COATED ORAL 2 TIMES DAILY
COMMUNITY

## 2020-02-27 RX ORDER — FLUTICASONE PROPIONATE 50 MCG
2 SPRAY, SUSPENSION (ML) NASAL DAILY
COMMUNITY

## 2020-02-28 ENCOUNTER — ANESTHESIA EVENT (OUTPATIENT)
Dept: ENDOSCOPY | Age: 65
End: 2020-02-28
Payer: MEDICARE

## 2020-02-28 ENCOUNTER — HOSPITAL ENCOUNTER (OUTPATIENT)
Age: 65
Setting detail: OUTPATIENT SURGERY
Discharge: HOME | End: 2020-02-28
Attending: INTERNAL MEDICINE | Admitting: INTERNAL MEDICINE
Payer: MEDICARE

## 2020-02-28 ENCOUNTER — ANESTHESIA (OUTPATIENT)
Dept: ENDOSCOPY | Age: 65
End: 2020-02-28
Payer: MEDICARE

## 2020-02-28 VITALS
HEART RATE: 79 BPM | WEIGHT: 220 LBS | HEIGHT: 73 IN | DIASTOLIC BLOOD PRESSURE: 76 MMHG | OXYGEN SATURATION: 98 % | SYSTOLIC BLOOD PRESSURE: 146 MMHG | RESPIRATION RATE: 16 BRPM | TEMPERATURE: 97.6 F | BODY MASS INDEX: 29.16 KG/M2

## 2020-02-28 LAB
GLUCOSE BLD STRIP.AUTO-MCNC: 123 MG/DL (ref 65–100)
SERVICE CMNT-IMP: ABNORMAL

## 2020-02-28 PROCEDURE — 74011250636 HC RX REV CODE- 250/636: Performed by: NURSE ANESTHETIST, CERTIFIED REGISTERED

## 2020-02-28 PROCEDURE — 76040000019: Performed by: INTERNAL MEDICINE

## 2020-02-28 PROCEDURE — 76060000031 HC ANESTHESIA FIRST 0.5 HR: Performed by: INTERNAL MEDICINE

## 2020-02-28 PROCEDURE — 77030018712 HC DEV BLLN INFL BSC -B: Performed by: INTERNAL MEDICINE

## 2020-02-28 PROCEDURE — C1726 CATH, BAL DIL, NON-VASCULAR: HCPCS | Performed by: INTERNAL MEDICINE

## 2020-02-28 PROCEDURE — 77030021593 HC FCPS BIOP ENDOSC BSC -A: Performed by: INTERNAL MEDICINE

## 2020-02-28 PROCEDURE — 74011000250 HC RX REV CODE- 250: Performed by: NURSE ANESTHETIST, CERTIFIED REGISTERED

## 2020-02-28 PROCEDURE — 82962 GLUCOSE BLOOD TEST: CPT

## 2020-02-28 PROCEDURE — 88305 TISSUE EXAM BY PATHOLOGIST: CPT

## 2020-02-28 RX ORDER — DEXTROMETHORPHAN/PSEUDOEPHED 2.5-7.5/.8
1.2 DROPS ORAL
Status: DISCONTINUED | OUTPATIENT
Start: 2020-02-28 | End: 2020-02-28 | Stop reason: HOSPADM

## 2020-02-28 RX ORDER — SODIUM CHLORIDE 9 MG/ML
INJECTION, SOLUTION INTRAVENOUS
Status: DISCONTINUED | OUTPATIENT
Start: 2020-02-28 | End: 2020-02-28 | Stop reason: HOSPADM

## 2020-02-28 RX ORDER — SODIUM CHLORIDE 9 MG/ML
150 INJECTION, SOLUTION INTRAVENOUS CONTINUOUS
Status: DISCONTINUED | OUTPATIENT
Start: 2020-02-28 | End: 2020-02-28 | Stop reason: HOSPADM

## 2020-02-28 RX ORDER — EPINEPHRINE 0.1 MG/ML
1 INJECTION INTRACARDIAC; INTRAVENOUS
Status: DISCONTINUED | OUTPATIENT
Start: 2020-02-28 | End: 2020-02-28 | Stop reason: HOSPADM

## 2020-02-28 RX ORDER — SODIUM CHLORIDE 0.9 % (FLUSH) 0.9 %
5-40 SYRINGE (ML) INJECTION EVERY 8 HOURS
Status: DISCONTINUED | OUTPATIENT
Start: 2020-02-28 | End: 2020-02-28 | Stop reason: HOSPADM

## 2020-02-28 RX ORDER — MIDAZOLAM HYDROCHLORIDE 1 MG/ML
.25-5 INJECTION, SOLUTION INTRAMUSCULAR; INTRAVENOUS
Status: DISCONTINUED | OUTPATIENT
Start: 2020-02-28 | End: 2020-02-28 | Stop reason: HOSPADM

## 2020-02-28 RX ORDER — FENTANYL CITRATE 50 UG/ML
200 INJECTION, SOLUTION INTRAMUSCULAR; INTRAVENOUS
Status: DISCONTINUED | OUTPATIENT
Start: 2020-02-28 | End: 2020-02-28 | Stop reason: HOSPADM

## 2020-02-28 RX ORDER — ATROPINE SULFATE 0.1 MG/ML
0.5 INJECTION INTRAVENOUS
Status: DISCONTINUED | OUTPATIENT
Start: 2020-02-28 | End: 2020-02-28 | Stop reason: HOSPADM

## 2020-02-28 RX ORDER — LIDOCAINE HYDROCHLORIDE 20 MG/ML
INJECTION, SOLUTION EPIDURAL; INFILTRATION; INTRACAUDAL; PERINEURAL AS NEEDED
Status: DISCONTINUED | OUTPATIENT
Start: 2020-02-28 | End: 2020-02-28 | Stop reason: HOSPADM

## 2020-02-28 RX ORDER — SODIUM CHLORIDE 0.9 % (FLUSH) 0.9 %
5-40 SYRINGE (ML) INJECTION AS NEEDED
Status: DISCONTINUED | OUTPATIENT
Start: 2020-02-28 | End: 2020-02-28 | Stop reason: HOSPADM

## 2020-02-28 RX ORDER — PROPOFOL 10 MG/ML
INJECTION, EMULSION INTRAVENOUS AS NEEDED
Status: DISCONTINUED | OUTPATIENT
Start: 2020-02-28 | End: 2020-02-28 | Stop reason: HOSPADM

## 2020-02-28 RX ADMIN — LIDOCAINE HYDROCHLORIDE 40 MG: 20 INJECTION, SOLUTION EPIDURAL; INFILTRATION; INTRACAUDAL; PERINEURAL at 15:11

## 2020-02-28 RX ADMIN — SODIUM CHLORIDE: 900 INJECTION, SOLUTION INTRAVENOUS at 15:11

## 2020-02-28 RX ADMIN — PROPOFOL 50 MG: 10 INJECTION, EMULSION INTRAVENOUS at 15:24

## 2020-02-28 RX ADMIN — PROPOFOL 30 MG: 10 INJECTION, EMULSION INTRAVENOUS at 15:15

## 2020-02-28 RX ADMIN — PROPOFOL 50 MG: 10 INJECTION, EMULSION INTRAVENOUS at 15:13

## 2020-02-28 RX ADMIN — PROPOFOL 50 MG: 10 INJECTION, EMULSION INTRAVENOUS at 15:30

## 2020-02-28 RX ADMIN — PROPOFOL 50 MG: 10 INJECTION, EMULSION INTRAVENOUS at 15:27

## 2020-02-28 RX ADMIN — PROPOFOL 30 MG: 10 INJECTION, EMULSION INTRAVENOUS at 15:16

## 2020-02-28 RX ADMIN — PROPOFOL 40 MG: 10 INJECTION, EMULSION INTRAVENOUS at 15:20

## 2020-02-28 NOTE — H&P
[address], [address] [place] [address]          Pre-procedure History and Physical       NAME:  Alisia Nageotte   :   1955   MRN:   [id number]     CHIEF COMPLAINT/HPI: See procedure note    PMH:  Past Medical History:   Diagnosis Date    Arrhythmia     R BUNDLE BRANCH BLOCK    Arthritis     Diabetes ([address]     TYPE II, INSULIN DEPENDANT    GERD (gastroesophageal reflux disease)     MARCUS'S ESOPHAGUS    Hypertension     Liver disease     FATTY LIVER    Other ill-defined conditions(799.89)     High Cholesterol    Other ill-defined conditions(799.89)     IRON DEFICIENT ANEMIA    Psychiatric disorder     Depression    Unspecified sleep apnea     CPAP       PSH:  Past Surgical History:   Procedure Laterality Date    HX GI  2007    COLONOSCOPY    HX GI  2017    ENDOSCOPY, UPPER    HX HEENT      EYELID REDUCTION    HX KNEE ARTHROSCOPY Left 2014    MENISCUS REPAIR    HX KNEE ARTHROSCOPY Right 2014    HX LAP CHOLECYSTECTOMY  2007    HX OTHER SURGICAL Right 2012    LIPOMA EXCISION UPPER ABD       Allergies:  No Known Allergies    Home Medications:  Prior to Admission Medications   Prescriptions Last Dose Informant Patient Reported? Taking? Cholecalciferol, Vitamin D3, 2,000 unit cap 2020 at Unknown time Self Yes Yes   Sig: Take 2,000 Units by mouth daily. Liraglutide (VICTOZA) 0.6 mg/0.1 mL (18 mg/3 mL) sub-q pen 2020 at Unknown time Self Yes Yes   Si.8 mg by SubCUTAneous route daily. diphenhydramine-acetaminophen (EXCEDRIN PM)  mg Tab Not Taking at Unknown time Self Yes No   Sig: Take 2 Tabs by mouth daily as needed. empagliflozin (JARDIANCE) 10 mg tablet Not Taking at Unknown time Self Yes No   Sig: Take 10 mg by mouth daily. escitalopram (LEXAPRO) 20 mg tablet 2020 at Unknown time Self Yes Yes   Sig: Take 30 mg by mouth daily.  Takes 30 mg daily   famotidine (PEPCID) 20 mg tablet 2020 at Unknown time  Yes Yes   Sig: Take 20 mg by mouth two (2) times a day. Indications: gastroesophageal reflux disease   felodipine (PLENDIL SR) 5 mg 24 hr tablet 2020 at Unknown time Self Yes Yes   Sig: Take 5 mg by mouth daily. fluticasone propionate (FLONASE ALLERGY RELIEF) 50 mcg/actuation nasal spray 2020 at Unknown time  Yes Yes   Si Sprays by Both Nostrils route daily. Indications: inflammation of the nose due to an allergy   gabapentin (NEURONTIN) 300 mg capsule 2020 at Unknown time  Yes Yes   Sig: Take 300 mg by mouth three (3) times daily. Indications: neuropathic pain   insulin glargine (TOUJEO SOLOSTAR) 300 unit/mL (1.5 mL) inpn 2020 at Unknown time Self Yes Yes   Si Units by SubCUTAneous route two (2) times a day. MORNING AND BEDTIME. insulin lispro (HUMALOG) 100 unit/mL injection 2020 at Unknown time Self Yes Yes   Sig: 10 Units by SubCUTAneous route two (2) times a day. LUNCH AND DINNER   quinapril (ACCUPRIL) 40 mg tablet 2020 at Unknown time Self Yes Yes   Sig: Take 40 mg by mouth two (2) times a day. rosuvastatin (CRESTOR) 40 mg tablet 2020 at Unknown time Self Yes Yes   Sig: Take 40 mg by mouth nightly. NON FORMULARY    sitaGLIPtin-metFORMIN (JANUMET) 50-1,000 mg per tablet 2020 at Unknown time Self Yes Yes   Sig: Take 1 Tab by mouth two (2) times daily (with meals).       Facility-Administered Medications: None       Hospital Medications:  Current Facility-Administered Medications   Medication Dose Route Frequency    0.9% sodium chloride infusion  150 mL/hr IntraVENous CONTINUOUS    sodium chloride (NS) flush 5-40 mL  5-40 mL IntraVENous Q8H    sodium chloride (NS) flush 5-40 mL  5-40 mL IntraVENous PRN    midazolam (VERSED) injection 0.25-5 mg  0.25-5 mg IntraVENous Multiple    fentaNYL citrate (PF) injection 200 mcg  200 mcg IntraVENous Multiple    simethicone (MYLICON) 83KC/6.9IX oral drops 80 mg  1.2 mL Oral Multiple    atropine injection 0.5 mg  0.5 mg IntraVENous ONCE PRN    EPINEPHrine (ADRENALIN) 0.1 mg/mL syringe 1 mg  1 mg Endoscopically ONCE PRN       Family History:  Family History   Problem Relation Age of Onset    Hypertension Mother     Arthritis-osteo Mother     Alzheimer Father     Lung Disease Father     Parkinson's Disease Father     No Known Problems Sister     No Known Problems Sister     Anesth Problems Neg Hx        Social History:  Social History     Tobacco Use    Smoking status: Never Smoker    Smokeless tobacco: Never Used   Substance Use Topics    Alcohol use: Yes     Alcohol/week: 2.0 standard drinks     Types: 2 Cans of beer per week         PHYSICAL EXAM PRIOR TO SEDATION:  General: Alert, in no acute distress    Lungs:            CTA bilaterally  Heart:  Normal S1, S2    Abdomen: Soft, Non distended, Non tender. Normoactive bowel sounds. Assessment:   Stable for sedation administration.     Plan:   · Endoscopic procedure with sedation     Signed By: Toni Sen MD     2/28/2020  3:09 PM

## 2020-02-28 NOTE — ANESTHESIA PREPROCEDURE EVALUATION
Relevant Problems   No relevant active problems       Anesthetic History   No history of anesthetic complications            Review of Systems / Medical History  Patient summary reviewed, nursing notes reviewed and pertinent labs reviewed    Pulmonary  Within defined limits      Sleep apnea: CPAP           Neuro/Psych   Within defined limits      Psychiatric history     Cardiovascular  Within defined limits  Hypertension        Dysrhythmias       Exercise tolerance: >4 METS     GI/Hepatic/Renal  Within defined limits   GERD           Endo/Other  Within defined limits  Diabetes    Arthritis     Other Findings              Physical Exam    Airway  Mallampati: III  TM Distance: 4 - 6 cm  Neck ROM: normal range of motion   Mouth opening: Normal     Cardiovascular  Regular rate and rhythm,  S1 and S2 normal,  no murmur, click, rub, or gallop             Dental  No notable dental hx       Pulmonary  Breath sounds clear to auscultation               Abdominal  GI exam deferred       Other Findings            Anesthetic Plan    ASA: 3  Anesthesia type: MAC          Induction: Intravenous  Anesthetic plan and risks discussed with: Patient

## 2020-02-28 NOTE — PROCEDURES
Eliel Moya [address] M.D.  [address] [place] [address], [address] [place] [address]  [phone]               Esophagogastroduodenoscopy (EGD) Procedure Note    NAME: Zari Pettit  :  1955  MRN:  [id number]    Indications:  Dysphagia/odynophagia     : Jenni Fritz MD    Referring Provider:  Mindy Eisenmenger, MD    Surgical Assistant: none    Prosthetic devices, grafts, tissues, transplant, or devices implanted: none    Medicine:  MAC anesthesia      Procedure Details:  After informed consent was obtained with all risks and benefits of the procedure explained and preprocedure exam completed, the patient was placed in the left lateral decubitus position. Universal protocol for patient identification was performed and documented in the nursing notes. Throughout the procedure, the patient's blood pressure was monitored at least every five minutes; pulse, and oxygen saturations were monitored continuously. All vital signs were documented in the nursing notes. The endoscope was inserted into the mouth and advanced under direct vision to second portion of the duodenum. A careful inspection was made as the gastroscope was withdrawn, including a retroflexed view of the proximal stomach; findings and interventions are described below.       Findings:   Esophagus: normal upper and middle esophagus s/p biopsies for EoE, C0M1 Mckay's esophagus s/p biopsies, lower esophagus TTS balloon dilation from 15 mm up to 16.5 mm for mild stenosis  Stomach: moderate diffuse erythema s/p biopsies, few sessile polyps with greatest diameter of 5 mm s/p biopsies  Duodenum: normal    Interventions:    biopsy of esophagus and stomach; esophageal dilation    Specimens:   ID Type Source Tests Collected by Time Destination   1 : Gastric R/O H. pylori    Roni Anders MD 2020 1515 Pathology   2 : Gastric Polyps Preservative   Roni Anders MD 2020 1519 Pathology   3 : [place] R/O Mckay's Esophagus Preservative   Roni Anders MD 2/28/2020 1520 Pathology   4 : Upper Esophagus - Rule out Eosinophilic Esophagitis Preservative   Roni Anders MD 2/28/2020 1522 Pathology        EBL: None          Complications:     No immediate complications        Impression:  -See post-procedure diagnoses. Recommendations:  -Await pathology.  -Continue PPI    Signed by:  Jenni Fritz MD         2/28/2020 3:39 PM

## 2020-02-28 NOTE — DISCHARGE INSTRUCTIONS
Patient Education        Mckay's Esophagus: Care Instructions  Your Care Instructions    The esophagus is the tube that connects the throat to the stomach. Food and liquids go through this tube. In Mckay's esophagus, the cells that line the tube change. This is usually because of gastroesophageal reflux disease (GERD). GERD causes acid from your stomach to back up into the esophagus. When you have Mckay's esophagus, you are slightly more likely to get cancer of the esophagus. So regular testing is important to watch for signs of this cancer. You can treat GERD to control your symptoms and feel better. Follow-up care is a key part of your treatment and safety. Be sure to make and go to all appointments, and call your doctor if you are having problems. It's also a good idea to know your test results and keep a list of the medicines you take. How can you care for yourself at home? · Take your medicines exactly as prescribed. Call your doctor if you think you are having a problem with your medicine. · If you take over-the-counter medicine, such as antacids or acid reducers, follow all instructions on the label. If you use these medicines often, talk with your doctor. Be careful when you take over-the-counter antacid medicines. Many of these medicines have aspirin in them. Read the label to make sure that you are not taking more than the recommended dose. Too much aspirin can be harmful. · Do not smoke or chew tobacco. Smoking can make GERD worse. If you need help quitting, talk to your doctor about stop-smoking programs and medicines. These can increase your chances of quitting for good. · Chocolate, mint, and alcohol can make GERD worse. They relax the valve between the esophagus and the stomach. · Spicy foods, foods that have a lot of acid (like tomatoes and oranges), and coffee can make GERD symptoms worse in some people.  If your symptoms are worse after you eat a certain food, you may want to stop eating that food to see if your symptoms get better. · Eat smaller meals, and more often. After eating, wait 2 to 3 hours before you lie down. · Raise the head of your bed 6 to 8 inches by putting blocks under the frame or a foam wedge under the head of the mattress. · Do not wear tight clothing around your midsection. · If you are overweight, lose weight. Even losing 5 to 10 pounds can help. When should you call for help? Call your doctor now or seek immediate medical care if:    · You have new or worse belly pain.     · You are vomiting.    Watch closely for changes in your health, and be sure to contact your doctor if:    · You have any pain or difficulty swallowing.     · You are losing weight.     · You have new or worse symptoms, such as heartburn.     · You do not get better as expected. Where can you learn more? Go to http://apollo-tavo.info/. Enter L146 in the search box to learn more about \"Mckay's Esophagus: Care Instructions. \"  Current as of: November 7, 2018  Content Version: 12.2  © 8912-5274 [employer]. Care instructions adapted under license by [employer] (which disclaims liability or warranty for this information). If you have questions about a medical condition or this instruction, always ask your healthcare professional. [place] [address] any warranty or liability for your use of this information. Eliel Delarosa 91  Joss Espino M.D.  [address] [place] [address]  [phone]         [address]  [id number]  1955    DISCOMFORT:  Sore throat- throat lozenges or warm salt water gargle  Redness at IV site- apply warm compress to area; if redness or soreness persist- contact your physician  Gaseous discomfort- walking, belching will help relieve any discomfort  You may not operate a vehicle for 12 hours  You may not engage in an occupation involving machinery or appliances for the  rest of today  You may not drink alcoholic beverages for at least 12 hours  Avoid making any critical decisions for at least 24 hours    DIET:   You may resume your normal diet, but some patients find that heavy or large  meals may lead to indigestion or vomiting. I suggest a light meal as first food  Intake. I recommend a whole food, plant-based diet for your overall health. ACTIVITY:  You may resume your normal daily activities. It is recommended that you spend the remainder of the day resting - avoid any strenuous activity. CALL M.D. IF ANY SIGN OF:   Increasing pain, nausea, vomiting  Abdominal distension (swelling)  Significant bleeding (oral or rectal)  Fever   Pain in chest area  Shortness of breath    Additional Instructions:   Call Dr. Joss Espino if any questions or problems at [phone]   Setup follow-up office visit in 6 weeks  EGD showed Mckay's esophagus, dilation of the esophagus, gastritis, and gastric polyps. Continue PPI. Stop alcohol use. Soft mechanical diet today, advance diet tomorrow. Patient Education        Gastritis: Care Instructions  Your Care Instructions    Gastritis is a sore and upset stomach. It happens when something irritates the stomach lining. Many things can cause it. These include an infection such as the flu or something you ate or drank. Medicines or a sore on the lining of the stomach (ulcer) also can cause it. Your belly may bloat and ache. You may belch, vomit, and feel sick to your stomach. You should be able to relieve the problem by taking medicine. And it may help to change your diet. If gastritis lasts, your doctor may prescribe medicine. Follow-up care is a key part of your treatment and safety. Be sure to make and go to all appointments, and call your doctor if you are having problems. It's also a good idea to know your test results and keep a list of the medicines you take.   How can you care for yourself at home?  · If your doctor prescribed antibiotics, take them as directed. Do not stop taking them just because you feel better. You need to take the full course of antibiotics. · Be safe with medicines. If your doctor prescribed medicine to decrease stomach acid, take it as directed. Call your doctor if you think you are having a problem with your medicine. · Do not take any other medicine, including over-the-counter pain relievers, without talking to your doctor first.  · If your doctor recommends over-the-counter medicine to reduce stomach acid, such as Pepcid AC, Prilosec, Tagamet HB, or Zantac 75, follow the directions on the label. · Drink plenty of fluids (enough so that your urine is light yellow or clear like water) to prevent dehydration. Choose water and other caffeine-free clear liquids. If you have kidney, heart, or liver disease and have to limit fluids, talk with your doctor before you increase the amount of fluids you drink. · Limit how much alcohol you drink. · Avoid coffee, tea, cola drinks, chocolate, and other foods with caffeine. They increase stomach acid. When should you call for help? Call 911 anytime you think you may need emergency care. For example, call if:    · You vomit blood or what looks like coffee grounds.     · You pass maroon or very bloody stools.    Call your doctor now or seek immediate medical care if:    · You start breathing fast and have not produced urine in the last 8 hours.     · You cannot keep fluids down.    Watch closely for changes in your health, and be sure to contact your doctor if:    · You do not get better as expected. Where can you learn more? Go to http://apollo-tavo.info/. Enter [id number] in the search box to learn more about \"Gastritis: Care Instructions. \"  Current as of: November 7, 2018  Content Version: 12.2  © 1347-1553 [employer].  Care instructions adapted under license by [employer] (which disclaims liability or warranty for this information). If you have questions about a medical condition or this instruction, always ask your healthcare professional. [place] [address] any warranty or liability for your use of this information.

## 2020-02-28 NOTE — PROGRESS NOTES
TRANSFER - IN REPORT:    Verbal report received from [address] [place] [address],[address] RN(name) on Jadiel Giraldo  being received from (unit) for routine post - op      Report consisted of patients Situation, Background, Assessment and   Recommendations(SBAR). Information from the following report(s) Procedure Summary was reviewed with the receiving nurse. Opportunity for questions and clarification was provided. Assessment completed upon patients arrival to unit and care assumed.

## 2020-02-28 NOTE — PROGRESS NOTES

## 2020-03-02 NOTE — ANESTHESIA POSTPROCEDURE EVALUATION
Procedure(s):  EGD WITH DILATION  ESOPHAGEAL DILATION  ESOPHAGOGASTRODUODENAL (EGD) BIOPSY. MAC    Anesthesia Post Evaluation        Patient location during evaluation: PACU  Note status: Adequate. Level of consciousness: responsive to verbal stimuli and sleepy but conscious  Pain management: satisfactory to patient  Airway patency: patent  Anesthetic complications: no  Cardiovascular status: acceptable  Respiratory status: acceptable  Hydration status: acceptable  Comments: +Post-Anesthesia Evaluation and Assessment    Patient: Zari Pettit MRN: [id number]  SSN: [id number]   YOB: 1955  Age: 71 y.o. Sex: male          Cardiovascular Function/Vital Signs    /76   Pulse 79   Temp 36.4 °C (97.6 °F)   Resp 16   Ht 6' 1\" (1.854 m)   Wt 99.8 kg (220 lb)   SpO2 98%   BMI 29.03 kg/m²     Patient is status post Procedure(s):  EGD WITH DILATION  ESOPHAGEAL DILATION  ESOPHAGOGASTRODUODENAL (EGD) BIOPSY. Nausea/Vomiting: Controlled. Postoperative hydration reviewed and adequate. Pain:  Pain Scale 1: Numeric (0 - 10) (02/28/20 1601)  Pain Intensity 1: 0 (02/28/20 1601)   Managed. Neurological Status: At baseline. Mental Status and Level of Consciousness: Arousable. Pulmonary Status:   O2 Device: Room air (02/28/20 1601)   Adequate oxygenation and airway patent. Complications related to anesthesia: None    Post-anesthesia assessment completed. No concerns. I have evaluated the patient and the patient is stable and ready to be discharged from PACU .     Signed By: Alex Garza MD    3/2/2020        Vitals Value Taken Time   /76 2/28/2020  4:06 PM   Temp 36.4 °C (97.6 °F) 2/28/2020  3:45 PM   Pulse 79 2/28/2020  4:06 PM   Resp 16 2/28/2020  4:06 PM   SpO2 98 % 2/28/2020  4:06 PM

## 2020-12-08 ENCOUNTER — TRANSCRIBE ORDER (OUTPATIENT)
Dept: SCHEDULING | Age: 65
End: 2020-12-08

## 2020-12-08 DIAGNOSIS — I42.1 IHSS (IDIOPATHIC HYPERTROPHIC SUBAORTIC STENOSIS) (HCC): Primary | ICD-10-CM

## 2020-12-23 ENCOUNTER — TRANSCRIBE ORDER (OUTPATIENT)
Dept: SCHEDULING | Age: 65
End: 2020-12-23

## 2020-12-23 DIAGNOSIS — I42.9 CARDIOMYOPATHY (HCC): Primary | ICD-10-CM

## 2021-02-10 ENCOUNTER — HOSPITAL ENCOUNTER (OUTPATIENT)
Dept: MRI IMAGING | Age: 66
Discharge: HOME | End: 2021-02-10
Attending: INTERNAL MEDICINE
Payer: MEDICARE

## 2021-02-10 VITALS — BODY MASS INDEX: 31.66 KG/M2 | WEIGHT: 240 LBS

## 2021-02-10 DIAGNOSIS — I42.9 CARDIOMYOPATHY (HCC): ICD-10-CM

## 2021-02-10 PROCEDURE — 74011636320 HC RX REV CODE- 636/320: Performed by: INTERNAL MEDICINE

## 2021-02-10 PROCEDURE — 77030021566

## 2021-02-10 PROCEDURE — 75561 CARDIAC MRI FOR MORPH W/DYE: CPT

## 2021-02-10 PROCEDURE — A9576 INJ PROHANCE MULTIPACK: HCPCS | Performed by: INTERNAL MEDICINE

## 2021-02-10 RX ADMIN — GADOTERIDOL 32 ML: 279.3 INJECTION, SOLUTION INTRAVENOUS at 11:19

## 2021-08-11 NOTE — ANESTHESIA PREPROCEDURE EVALUATION
Anesthetic History   No history of anesthetic complications            Review of Systems / Medical History  Patient summary reviewed, nursing notes reviewed and pertinent labs reviewed    Pulmonary        Sleep apnea: CPAP           Neuro/Psych         Psychiatric history     Cardiovascular    Hypertension        Dysrhythmias   Hyperlipidemia         GI/Hepatic/Renal     GERD: well controlled      Liver disease (GALLOWAY)     Endo/Other    Diabetes: type 2    Arthritis     Other Findings              Physical Exam    Airway  Mallampati: II  TM Distance: > 6 cm  Neck ROM: normal range of motion   Mouth opening: Normal     Cardiovascular  Regular rate and rhythm,  S1 and S2 normal,  no murmur, click, rub, or gallop             Dental  No notable dental hx       Pulmonary  Breath sounds clear to auscultation               Abdominal  GI exam deferred       Other Findings            Anesthetic Plan    ASA: 3  Anesthesia type: general          Induction: Intravenous  Anesthetic plan and risks discussed with: Patient
Documentation:  ** 8/4 PN DR. Jaime: Assessment: 4. Chronic opiate use - I-STOP checked. pt has active prescriptions for morphine extended release and oxycodone     From previous encounter (10/28/2020 - 11/3/2020) Acct#: [id number]  ** 10/31/2021 PN Resident DR. Reardon:         Chronic pain        - Patient c/o chronic pain after knee arthroplasty 2yrs ago, now complains of shoulder pain also       - Home pain meds, Confirmed with pharmacy & checked iStop, Rx from [facility]       -- Dilaudid 4mg q8 PRN       -- Morphine ER 100mg qid       -- Oxycodone IR 30mg q8    From previous encounter (4/3-4/2021) Acct#: [id number]  ** 4/3 H&P: Diffuse OA due to morbid obesity:            - C/w MS contin 100 mg q6h standing             - C/w oxycodone 30 mg q4h PRN for breakthrough pain            - All those recs are pain management's recs from previous admission            - Also recommended was methadone q8h but patient do not take it as often.       Orders:  8/1: Morphine 4 mg IV stop after one dose  8/1: Morphine 4 mg IV stop after one dose  8/2 - 8/4: Oxycodone 5mg / acetaminophen 325 mg oral every 4 hours. PRN or moderate pain      8/2 - 8/4: Morphine 4 mg IV/ 4 hours    Query:                                                                 Based on the clinical indicators and your clinical judgment, please clarify if chronic opiate use can be further specified as:  · Opiate Maintenance for chronic pain  · Opiate dependence for chronic pain  · Other (please specify):  · Clinically unable to determine

## 2021-11-19 ENCOUNTER — HOSPITAL ENCOUNTER (OUTPATIENT)
Dept: PREADMISSION TESTING | Age: 66
Discharge: HOME | End: 2021-11-19
Attending: INTERNAL MEDICINE
Payer: MEDICARE

## 2021-11-19 ENCOUNTER — TRANSCRIBE ORDER (OUTPATIENT)
Dept: REGISTRATION | Age: 66
End: 2021-11-19

## 2021-11-19 DIAGNOSIS — Z01.812 PRE-PROCEDURE LAB EXAM: Primary | ICD-10-CM

## 2021-11-19 DIAGNOSIS — Z01.812 PRE-PROCEDURE LAB EXAM: ICD-10-CM

## 2021-11-19 PROCEDURE — U0005 INFEC AGEN DETEC AMPLI PROBE: HCPCS

## 2021-11-21 LAB
SARS-COV-2, XPLCVT: NOT DETECTED
SOURCE, COVRS: NORMAL

## 2021-11-24 ENCOUNTER — ANESTHESIA (OUTPATIENT)
Dept: ENDOSCOPY | Age: 66
End: 2021-11-24
Payer: MEDICARE

## 2021-11-24 ENCOUNTER — ANESTHESIA EVENT (OUTPATIENT)
Dept: ENDOSCOPY | Age: 66
End: 2021-11-24
Payer: MEDICARE

## 2021-11-24 ENCOUNTER — HOSPITAL ENCOUNTER (OUTPATIENT)
Age: 66
Setting detail: OUTPATIENT SURGERY
Discharge: HOME | End: 2021-11-24
Attending: INTERNAL MEDICINE | Admitting: INTERNAL MEDICINE
Payer: MEDICARE

## 2021-11-24 VITALS
HEART RATE: 75 BPM | WEIGHT: 238 LBS | RESPIRATION RATE: 14 BRPM | BODY MASS INDEX: 32.23 KG/M2 | OXYGEN SATURATION: 97 % | DIASTOLIC BLOOD PRESSURE: 77 MMHG | SYSTOLIC BLOOD PRESSURE: 153 MMHG | TEMPERATURE: 98.4 F | HEIGHT: 72 IN

## 2021-11-24 PROCEDURE — 88305 TISSUE EXAM BY PATHOLOGIST: CPT

## 2021-11-24 PROCEDURE — 76040000008: Performed by: INTERNAL MEDICINE

## 2021-11-24 PROCEDURE — C1726 CATH, BAL DIL, NON-VASCULAR: HCPCS | Performed by: INTERNAL MEDICINE

## 2021-11-24 PROCEDURE — 77030018712 HC DEV BLLN INFL BSC -B: Performed by: INTERNAL MEDICINE

## 2021-11-24 PROCEDURE — 74011250636 HC RX REV CODE- 250/636: Performed by: NURSE ANESTHETIST, CERTIFIED REGISTERED

## 2021-11-24 PROCEDURE — 77030029384 HC SNR POLYP CAPTVR II BSC -B: Performed by: INTERNAL MEDICINE

## 2021-11-24 PROCEDURE — 2709999900 HC NON-CHARGEABLE SUPPLY: Performed by: INTERNAL MEDICINE

## 2021-11-24 PROCEDURE — 76060000033 HC ANESTHESIA 1 TO 1.5 HR: Performed by: INTERNAL MEDICINE

## 2021-11-24 RX ORDER — SODIUM CHLORIDE, SODIUM LACTATE, POTASSIUM CHLORIDE, CALCIUM CHLORIDE 600; 310; 30; 20 MG/100ML; MG/100ML; MG/100ML; MG/100ML
INJECTION, SOLUTION INTRAVENOUS
Status: DISCONTINUED | OUTPATIENT
Start: 2021-11-24 | End: 2021-11-24 | Stop reason: HOSPADM

## 2021-11-24 RX ORDER — SODIUM CHLORIDE 9 MG/ML
150 INJECTION, SOLUTION INTRAVENOUS CONTINUOUS
Status: DISCONTINUED | OUTPATIENT
Start: 2021-11-24 | End: 2021-11-24 | Stop reason: HOSPADM

## 2021-11-24 RX ORDER — MIDAZOLAM HYDROCHLORIDE 1 MG/ML
.25-5 INJECTION, SOLUTION INTRAMUSCULAR; INTRAVENOUS
Status: DISCONTINUED | OUTPATIENT
Start: 2021-11-24 | End: 2021-11-24 | Stop reason: HOSPADM

## 2021-11-24 RX ORDER — EPINEPHRINE 0.1 MG/ML
1 INJECTION INTRACARDIAC; INTRAVENOUS
Status: DISCONTINUED | OUTPATIENT
Start: 2021-11-24 | End: 2021-11-24 | Stop reason: HOSPADM

## 2021-11-24 RX ORDER — SODIUM CHLORIDE 0.9 % (FLUSH) 0.9 %
5-40 SYRINGE (ML) INJECTION AS NEEDED
Status: DISCONTINUED | OUTPATIENT
Start: 2021-11-24 | End: 2021-11-24 | Stop reason: HOSPADM

## 2021-11-24 RX ORDER — FENTANYL CITRATE 50 UG/ML
200 INJECTION, SOLUTION INTRAMUSCULAR; INTRAVENOUS
Status: DISCONTINUED | OUTPATIENT
Start: 2021-11-24 | End: 2021-11-24 | Stop reason: HOSPADM

## 2021-11-24 RX ORDER — ATROPINE SULFATE 0.1 MG/ML
0.5 INJECTION INTRAVENOUS
Status: DISCONTINUED | OUTPATIENT
Start: 2021-11-24 | End: 2021-11-24 | Stop reason: HOSPADM

## 2021-11-24 RX ORDER — SODIUM CHLORIDE 0.9 % (FLUSH) 0.9 %
5-40 SYRINGE (ML) INJECTION EVERY 8 HOURS
Status: DISCONTINUED | OUTPATIENT
Start: 2021-11-24 | End: 2021-11-24 | Stop reason: HOSPADM

## 2021-11-24 RX ORDER — PROPOFOL 10 MG/ML
INJECTION, EMULSION INTRAVENOUS AS NEEDED
Status: DISCONTINUED | OUTPATIENT
Start: 2021-11-24 | End: 2021-11-24 | Stop reason: HOSPADM

## 2021-11-24 RX ORDER — DEXTROMETHORPHAN/PSEUDOEPHED 2.5-7.5/.8
1.2 DROPS ORAL
Status: DISCONTINUED | OUTPATIENT
Start: 2021-11-24 | End: 2021-11-24 | Stop reason: HOSPADM

## 2021-11-24 RX ADMIN — SODIUM CHLORIDE, POTASSIUM CHLORIDE, SODIUM LACTATE AND CALCIUM CHLORIDE: 600; 310; 30; 20 INJECTION, SOLUTION INTRAVENOUS at 12:20

## 2021-11-24 RX ADMIN — PROPOFOL 20 MG: 10 INJECTION, EMULSION INTRAVENOUS at 12:29

## 2021-11-24 RX ADMIN — PROPOFOL 20 MG: 10 INJECTION, EMULSION INTRAVENOUS at 13:02

## 2021-11-24 RX ADMIN — PROPOFOL 20 MG: 10 INJECTION, EMULSION INTRAVENOUS at 13:06

## 2021-11-24 RX ADMIN — PROPOFOL 20 MG: 10 INJECTION, EMULSION INTRAVENOUS at 12:52

## 2021-11-24 RX ADMIN — PROPOFOL 20 MG: 10 INJECTION, EMULSION INTRAVENOUS at 12:57

## 2021-11-24 RX ADMIN — PROPOFOL 30 MG: 10 INJECTION, EMULSION INTRAVENOUS at 12:39

## 2021-11-24 RX ADMIN — PROPOFOL 20 MG: 10 INJECTION, EMULSION INTRAVENOUS at 13:18

## 2021-11-24 RX ADMIN — PROPOFOL 20 MG: 10 INJECTION, EMULSION INTRAVENOUS at 12:49

## 2021-11-24 RX ADMIN — PROPOFOL 30 MG: 10 INJECTION, EMULSION INTRAVENOUS at 13:13

## 2021-11-24 RX ADMIN — PROPOFOL 30 MG: 10 INJECTION, EMULSION INTRAVENOUS at 12:36

## 2021-11-24 RX ADMIN — PROPOFOL 30 MG: 10 INJECTION, EMULSION INTRAVENOUS at 12:28

## 2021-11-24 RX ADMIN — PROPOFOL 20 MG: 10 INJECTION, EMULSION INTRAVENOUS at 12:25

## 2021-11-24 RX ADMIN — PROPOFOL 30 MG: 10 INJECTION, EMULSION INTRAVENOUS at 13:10

## 2021-11-24 RX ADMIN — PROPOFOL 30 MG: 10 INJECTION, EMULSION INTRAVENOUS at 12:24

## 2021-11-24 RX ADMIN — PROPOFOL 20 MG: 10 INJECTION, EMULSION INTRAVENOUS at 12:45

## 2021-11-24 RX ADMIN — PROPOFOL 20 MG: 10 INJECTION, EMULSION INTRAVENOUS at 13:00

## 2021-11-24 RX ADMIN — PROPOFOL 20 MG: 10 INJECTION, EMULSION INTRAVENOUS at 12:33

## 2021-11-24 RX ADMIN — PROPOFOL 50 MG: 10 INJECTION, EMULSION INTRAVENOUS at 12:23

## 2021-11-24 RX ADMIN — PROPOFOL 20 MG: 10 INJECTION, EMULSION INTRAVENOUS at 12:31

## 2021-11-24 RX ADMIN — PROPOFOL 100 MG: 10 INJECTION, EMULSION INTRAVENOUS at 12:22

## 2021-11-24 RX ADMIN — PROPOFOL 30 MG: 10 INJECTION, EMULSION INTRAVENOUS at 13:24

## 2021-11-24 RX ADMIN — PROPOFOL 30 MG: 10 INJECTION, EMULSION INTRAVENOUS at 12:43

## 2021-11-24 NOTE — DISCHARGE INSTRUCTIONS
Eliel Anderson [place] [id number] Nahum Parker M.D.  [address] [place] [address], [address] [place] [address]  [phone]                      EGD/COLONOSCOPY [address] [facility]  [id number]  1955    DISCOMFORT:  Redness at IV site- apply warm compress to area; if redness or soreness persist- contact your physician  There may be a slight amount of blood passed from the rectum  Gaseous discomfort- walking, belching will help relieve any discomfort  You may not operate a vehicle for 12 hours  You may not  engage in an occupation involving machinery or appliances for rest of today  You may not  drink alcoholic beverages for at least 12 hours  Avoid making any critical decisions for at least 24 hour    DIET:   You may resume your normal diet, but some patients find that heavy or large  meals may lead to indigestion or vomiting. I suggest a light meal as first food  Intake. I recommend a whole food, plant-based diet for your overall health. ACTIVITY:  You may resume your normal daily activities. It is recommended that you spend the remainder of the day resting - avoid any strenuous activity. CALL M.D. ANY SIGN OF   Increasing pain, nausea, vomiting  Abdominal distension (swelling)  New increased bleeding (oral or rectal)  Fever (chills)  Pain in chest area  Bloody discharge from nose or mouth  Shortness of breath    Additional Instructions:   Call Dr. Nahum Parker if any questions or problems at [phone]   You should receive the biopsy results by phone or mail within 3 weeks, if not, call my office for the results. EGD showed butt's esophagus, esophagus dilation, stomach polyps, gastric erythema. Soft mechanical diet today, advance tomorrow. Colonoscopy showed 3 polyps removed, diverticulosis, incomplete colonoscopy due to redundant colon. You will get call next week to schedule for CT scan to further evaluate the colon.           Learning About Coronavirus (COVID-19)  Coronavirus (COVID-19): Overview  What is coronavirus ([id number])? The coronavirus disease (COVID-19) is caused by a virus. It is an illness that was first found in [place], [place], in December 2019. It has since spread worldwide. The virus can cause fever, cough, and trouble breathing. In severe cases, it can cause pneumonia and make it hard to breathe without help. It can cause death. Coronaviruses are a large group of viruses. They cause the common cold. They also cause more serious illnesses like Middle East respiratory syndrome (MERS) and severe acute respiratory syndrome (SARS). COVID-19 is caused by a novel coronavirus. That means it's a new type that has not been seen in people before. This virus spreads person-to-person through droplets from coughing and sneezing. It can also spread when you are close to someone who is infected. And it can spread when you touch something that has the virus on it, such as a doorknob or a tabletop. What can you do to protect yourself from coronavirus (COVID-19)? The best way to protect yourself from getting sick is to:  · Avoid areas where there is an outbreak. · Avoid contact with people who may be infected. · Wash your hands often with soap or alcohol-based hand sanitizers. · Avoid crowds and try to stay at least 6 feet away from other people. · Wash your hands often, especially after you cough or sneeze. Use soap and water, and scrub for at least 20 seconds. If soap and water aren't available, use an alcohol-based hand . · Avoid touching your mouth, nose, and eyes. What can you do to avoid spreading the virus to others? To help avoid spreading the virus to others:  · Cover your mouth with a tissue when you cough or sneeze. Then throw the tissue in the trash. · Use a disinfectant to clean things that you touch often. · Stay home if you are sick or have been exposed to the virus. Don't go to school, work, or public areas. And don't use public transportation.   · If you are sick:  ? Leave your home only if you need to get medical care. But call the doctor's office first so they know you're coming. And wear a face mask, if you have one.  ? If you have a face mask, wear it whenever you're around other people. It can help stop the spread of the virus when you cough or sneeze. ? Clean and disinfect your home every day. Use household  and disinfectant wipes or sprays. Take special care to clean things that you grab with your hands. These include doorknobs, remote controls, phones, and handles on your refrigerator and microwave. And don't forget countertops, tabletops, bathrooms, and computer keyboards. When to call for help  Call 911 anytime you think you may need emergency care. For example, call if:  · You have severe trouble breathing. (You can't talk at all.)  · You have constant chest pain or pressure. · You are severely dizzy or lightheaded. · You are confused or can't think clearly. · Your face and lips have a blue color. · You pass out (lose consciousness) or are very hard to wake up. Call your doctor now if you develop symptoms such as:  · Shortness of breath. · Fever. · Cough. If you need to get care, call ahead to the doctor's office for instructions before you go. Make sure you wear a face mask, if you have one, to prevent exposing other people to the virus. Where can you get the latest information? The following health organizations are tracking and studying this virus. Their websites contain the most up-to-date information. Nery [place]ett also learn what to do if you think you may have been exposed to the virus. · U.S. Centers for Disease Control and Prevention (CDC): The CDC provides updated news about the disease and travel advice. The website also tells you how to prevent the spread of infection. www.cdc.gov  · World Health Organization [facility]): WHO offers information about the virus outbreaks.  WHO also has travel advice. www.who.int  Current as of: April 1, 2020               Content Version: 12.4  © 5083-6646 [employer]. Care instructions adapted under license by your healthcare professional. If you have questions about a medical condition or this instruction, always ask your healthcare professional. [address] any warranty or liability for your use of this information.

## 2021-11-24 NOTE — ANESTHESIA PREPROCEDURE EVALUATION
Relevant Problems   No relevant active problems       Anesthetic History   No history of anesthetic complications            Review of Systems / Medical History  Patient summary reviewed, nursing notes reviewed and pertinent labs reviewed    Pulmonary        Sleep apnea           Neuro/Psych   Within defined limits      Psychiatric history     Cardiovascular  Within defined limits  Hypertension        Dysrhythmias       Exercise tolerance: >4 METS     GI/Hepatic/Renal  Within defined limits   GERD           Endo/Other  Within defined limits  Diabetes    Arthritis     Other Findings              Physical Exam    Airway  Mallampati: III  TM Distance: 4 - 6 cm  Neck ROM: normal range of motion   Mouth opening: Normal     Cardiovascular  Regular rate and rhythm,  S1 and S2 normal,  no murmur, click, rub, or gallop             Dental  No notable dental hx       Pulmonary  Breath sounds clear to auscultation               Abdominal  GI exam deferred       Other Findings            Anesthetic Plan    ASA: 3  Anesthesia type: MAC          Induction: Intravenous  Anesthetic plan and risks discussed with: Patient

## 2021-11-24 NOTE — PROCEDURES
Eliel Delarosa 91 Namrata Dennis M.D.  [place], [address] [facility]  [phone]               Esophagogastroduodenoscopy (EGD) Procedure Note    NAME: Shira Mcfadden  :  1955  MRN:  [id number]    Indications:  Dysphagia     : Sara Tena MD    Referring Provider:  Blayne Bernal MD    Staff: Endoscopy Rosette Gillettet: Ana Lazcano  Endoscopy RN-1: Mitra Suazo RN    Prosthetic devices, grafts, tissues, transplant, or devices implanted: none    Medicine:  MAC anesthesia      Procedure Details:  After informed consent was obtained with all risks and benefits of the procedure explained and preprocedure exam completed, the patient was placed in the left lateral decubitus position. Universal protocol for patient identification was performed and documented in the nursing notes. Throughout the procedure, the patient's blood pressure was monitored at least every five minutes; pulse, and oxygen saturations were monitored continuously. All vital signs were documented in the nursing notes. The endoscope was inserted into the mouth and advanced under direct vision to second portion of the duodenum. A careful inspection was made as the gastroscope was withdrawn, including a retroflexed view of the proximal stomach; findings and interventions are described below.       Findings:   Esophagus: normal upper and middle esophagus s/p biopsies for EoE, C0M1 Mckay's esophagus s/p biopsies-no nodules on NBI, lower esophagus TTS balloon dilation from 15 mm up to 18 mm for mild stenosis  Stomach: moderate diffuse erythema s/p biopsies, few sessile polyps with greatest diameter of 5 mm (previously biopsied and benign)  Duodenum: normal    Interventions:    biopsy of esophagus and stomach, s/p dilation of the esophagus    Specimens:   ID Type Source Tests Collected by Time Destination   1 : gastric bx, please R/O H. pylori Preservative Gastric  Estrellita Sandoval MD 11/24/2021 1227 Pathology   2 : lower esophagus bx, please R/O Mckay's esophagus & dysplagia Preservative   Ousmane Lam MD 11/24/2021 1229 Pathology   3 : proximal esophagus bx, please R/O EOE Preservative Esophagus, Proximal  Ousmane Lam MD 11/24/2021 1239 Pathology   4 : descending colon polyps, 2 Preservative Colon, Descending  Ousmane Lam MD 11/24/2021 1323 Pathology   5 : sigmoid colon polyp Preservative Sigmoid  Ousmane Lam MD 11/24/2021 1329 Pathology        EBL: None          Complications:     No immediate complications        Impression:  -See post-procedure diagnoses. Recommendations:  -Await pathology.  -Soft mechanical diet today, advance diet tomorrow  -Repeat EGD in 3 years    Signed by:  Erich Baker MD         11/24/2021 1:36 PM

## 2021-11-24 NOTE — PERIOP NOTES
.Patient has been evaluated by anesthesia pre-procedure. Patient alert and oriented. Vital signs will not be charted by the Endoscopy nurse. All vitals, airway, and loc are monitored by anesthesia staff throughout procedure. CRE balloon dilatation of the esophagus   15 mm Balloon inflated to 3 ATMs and held for 60 seconds. 16.5 mm Balloon inflated to 4.5 ATMs and held for 60 seconds. 18 mm Balloon inflated to 7 ATMs and held for 60 seconds. No subcutaneous crepitus of the chest or cervical region was noted post dilatation. .Endoscopes were pre-cleaned at bedside immediately following procedure by CT.

## 2021-11-24 NOTE — PROCEDURES
Eliel Anderson [facility] [address]ine Howard M.D.  [facility], [address]  [phone]               Colonoscopy Procedure Note    NAME: Clinton Lozada  :  1955  MRN:  [id number]    Indications:   Personal history of colon polyps (screening only)     : Abhijeet Ornelas MD    Referring Provider:  Homero Jama MD    Staff: Endoscopy [facility] Bench: Angelina Lee  Endoscopy RN-1: Altagracia Garduno RN    Prosthetic devices, grafts, tissues, transplant, or devices implanted: none    Medicines:  MAC anesthesia      Procedure Details:  After informed consent was obtained with all risks and benefits of the procedure explained and preprocedure exam completed, the patient was placed in the left lateral decubitus position. Universal protocol for patient identification was performed and documented in the nursing notes. Throughout the procedure, the patient's blood pressure was monitored at least every five minutes; pulse, and oxygen saturations were monitored continuously. All vital signs were documented in the nursing notes. A digital rectal exam was performed and was normal.  The Olympus videocolonoscope  was inserted in the rectum and carefully advanced to the transverse colon. The colonoscope was slowly withdrawn with careful evaluation between folds. Retroflexion in the rectum was performed; findings and interventions are described below. Procedure start time, extent reached time/cecum time, and procedure end time are documented in the nursing notes. The quality of preparation was adequate. Findings:   1. Redundant colon-only reached the transverse colon even with abdominal support, supine position, and colonoscope stiffener  2. 3 sessile polyps with greatest diameter of 5 mm (2 in the descending colon, 1 in the sigmoid colon) s/p cold snare polypectomy  3.  Mild L colon diverticulosis    Interventions:    3 complete polypectomy were performed using cold snare and the polyps were  retrieved    Specimens:   ID Type Source Tests Collected by Time Destination   1 : gastric bx, please R/O H. pylori Preservative Gastric  Jesusita Mcneal MD 11/24/2021 1227 Pathology   2 : lower esophagus bx, please R/O Mckay's esophagus & dysplagia Preservative   Jesusita Mcneal MD 11/24/2021 1229 Pathology   3 : proximal esophagus bx, please R/O EOE Preservative Esophagus, Proximal  Jesusita Mcneal MD 11/24/2021 1239 Pathology   4 : descending colon polyps, 2 Preservative Colon, Descending  Jesusita Mcneal MD 11/24/2021 1323 Pathology   5 : sigmoid colon polyp Preservative Sigmoid  Jesusita Mcneal MD 11/24/2021 1329 Pathology       EBL:  None. Complications:   No immediate complications     Impression:  -See post-procedure diagnoses. Recommendations:   -Await pathology. -CT colonography for further evaluation  Resume normal medication(s). Signed by:  Boone Soria MD          11/24/2021  1:40 PM

## 2021-11-24 NOTE — H&P
[address]  [address] [facility], [address] [place] [address]          Pre-procedure History and Physical       NAME:  Shira Mcfadden   :   1955   MRN:   [id number]     CHIEF COMPLAINT/HPI: dysphagia, colon polyps    PMH:  Past Medical History:   Diagnosis Date    Arrhythmia     R BUNDLE BRANCH BLOCK    Arthritis     Diabetes ([address]     TYPE II, INSULIN DEPENDANT    GERD (gastroesophageal reflux disease)     MARCUS'S ESOPHAGUS    Hypertension     Liver disease     FATTY LIVER    Other ill-defined conditions(799.89)     High Cholesterol    Other ill-defined conditions(799.89)     IRON DEFICIENT ANEMIA    Psychiatric disorder     Depression    Unspecified sleep apnea     CPAP       PSH:  Past Surgical History:   Procedure Laterality Date    HX GI  2007    COLONOSCOPY    HX GI  2017    ENDOSCOPY, UPPER    HX HEENT      EYELID REDUCTION    HX KNEE ARTHROSCOPY Left 2014    MENISCUS REPAIR    HX KNEE ARTHROSCOPY Right 2014    HX LAP CHOLECYSTECTOMY  2007    HX OTHER SURGICAL Right 2012    LIPOMA EXCISION UPPER ABD       Allergies:  No Known Allergies    Home Medications:  Prior to Admission Medications   Prescriptions Last Dose Informant Patient Reported? Taking? Cholecalciferol, Vitamin D3, 2,000 unit cap 2021 at Unknown time Self Yes Yes   Sig: Take 2,000 Units by mouth daily. diphenhydramine-acetaminophen (EXCEDRIN PM)  mg Tab Not Taking at Unknown time Self Yes No   Sig: Take 2 Tabs by mouth daily as needed. Patient not taking: Reported on 2021   empagliflozin (JARDIANCE) 10 mg tablet Not Taking at Unknown time Self Yes No   Sig: Take 10 mg by mouth daily. Patient not taking: Reported on 2021   escitalopram (LEXAPRO) 20 mg tablet 2021 at Unknown time Self Yes Yes   Sig: Take 30 mg by mouth daily. Takes 30 mg daily   famotidine (PEPCID) 20 mg tablet 2021 at Unknown time  Yes Yes   Sig: Take 20 mg by mouth two (2) times a day. Indications: gastroesophageal reflux disease   felodipine (PLENDIL SR) 5 mg 24 hr tablet 2021 at Unknown time Self Yes Yes   Sig: Take 5 mg by mouth daily. fluticasone propionate (FLONASE ALLERGY RELIEF) 50 mcg/actuation nasal spray 2021 at Unknown time  Yes Yes   Si Sprays by Both Nostrils route daily. Indications: inflammation of the nose due to an allergy   gabapentin (NEURONTIN) 300 mg capsule 2021 at Unknown time  Yes Yes   Sig: Take 300 mg by mouth three (3) times daily. Indications: neuropathic pain   insulin lispro (HUMALOG) 100 unit/mL injection 2021 at Unknown time Self Yes Yes   Sig: 10 Units by SubCUTAneous route two (2) times a day. LUNCH AND DINNER   quinapril (ACCUPRIL) 40 mg tablet 2021 at Unknown time Self Yes Yes   Sig: Take 40 mg by mouth two (2) times a day. rosuvastatin (CRESTOR) 40 mg tablet 2021 at Unknown time Self Yes Yes   Sig: Take 40 mg by mouth nightly.  NON FORMULARY       Facility-Administered Medications: None       Hospital Medications:  Current Facility-Administered Medications   Medication Dose Route Frequency    0.9% sodium chloride infusion  150 mL/hr IntraVENous CONTINUOUS    sodium chloride (NS) flush 5-40 mL  5-40 mL IntraVENous Q8H    sodium chloride (NS) flush 5-40 mL  5-40 mL IntraVENous PRN    midazolam (VERSED) injection 0.25-5 mg  0.25-5 mg IntraVENous Multiple    fentaNYL citrate (PF) injection 200 mcg  200 mcg IntraVENous Multiple    simethicone (MYLICON) 08II/3.6OS oral drops 80 mg  1.2 mL Oral Multiple    atropine injection 0.5 mg  0.5 mg IntraVENous ONCE PRN    EPINEPHrine (ADRENALIN) 0.1 mg/mL syringe 1 mg  1 mg Endoscopically ONCE PRN       Family History:  Family History   Problem Relation Age of Onset    Hypertension Mother     Arthritis-osteo Mother     Alzheimer's Disease Father     Lung Disease Father     Parkinson's Disease Father     No Known Problems Sister     No Known Problems Sister    Jessie Ho Problems Neg Hx        Social History:  Social History     Tobacco Use    Smoking status: Never Smoker    Smokeless tobacco: Never Used   Substance Use Topics    Alcohol use: Yes     Alcohol/week: 2.0 standard drinks     Types: 2 Cans of beer per week       The patient was counseled at length about the risks of simón Covid-19 in the tabitha-operative and post-operative states including the recovery window of their procedure. The patient was made aware that simón Covid-19 after a surgical procedure may worsen their prognosis for recovering from the virus and lend to a higher morbidity and or mortality risk. The patient was given the options of postponing their procedure. All of the risks, benefits, and alternatives were discussed. The patient does  wish to proceed with the procedure. PHYSICAL EXAM PRIOR TO SEDATION:  General: Alert, in no acute distress    Lungs:            CTA bilaterally  Heart:  Normal S1, S2    Abdomen: Soft, Non distended, Non tender. Normoactive bowel sounds. Assessment:   Stable for sedation administration.   Date of last colonoscopy: 3 years, Polyps  No    Plan:     · Endoscopic procedure with sedation     Signed By: Mikaela Duran MD     11/24/2021  12:22 PM

## 2021-11-29 NOTE — ANESTHESIA POSTPROCEDURE EVALUATION
Post-Anesthesia Evaluation and Assessment    Patient: Marvin Keyes MRN: [id number]  SSN: [id number]    YOB: 1955  Age: 78 y.o. Sex: male      I have evaluated the patient and they are stable and ready for discharge from the PACU. Cardiovascular Function/Vital Signs  Visit Vitals  BP (!) 153/77   Pulse 75   Temp 36.9 °C (98.4 °F)   Resp 14   Ht 6' (1.829 m)   Wt 108 kg (238 lb)   SpO2 97%   BMI 32.28 kg/m²       Patient is status post MAC anesthesia for Procedure(s):  COLONOSCOPY AND EGD   :-  ESOPHAGOGASTRODUODENOSCOPY (EGD)  ESOPHAGEAL DILATION  ENDOSCOPIC POLYPECTOMY  ESOPHAGOGASTRODUODENAL (EGD) BIOPSY. Nausea/Vomiting: None    Postoperative hydration reviewed and adequate. Pain:  Pain Scale 1: Numeric (0 - 10) (11/24/21 1355)  Pain Intensity 1: 0 (11/24/21 1355)   Managed    Neurological Status: At baseline    Mental Status, Level of Consciousness: Alert and  oriented to person, place, and time    Pulmonary Status:   O2 Device: None (Room air) (11/24/21 1355)   Adequate oxygenation and airway patent    Complications related to anesthesia: None    Post-anesthesia assessment completed. No concerns    Signed By: Glenys Cool MD     November 29, 2021              Procedure(s):  COLONOSCOPY AND EGD   :-  ESOPHAGOGASTRODUODENOSCOPY (EGD)  ESOPHAGEAL DILATION  ENDOSCOPIC POLYPECTOMY  ESOPHAGOGASTRODUODENAL (EGD) BIOPSY.     MAC    <BSHSIANPOST>    INITIAL Post-op Vital signs:   Vitals Value Taken Time   /77 11/24/21 1355   Temp 36.9 °C (98.4 °F) 11/24/21 1338   Pulse 75 11/24/21 1355   Resp 14 11/24/21 1355   SpO2 97 % 11/24/21 1355

## 2022-03-20 PROBLEM — R01.1 CARDIAC MURMUR: Status: ACTIVE | Noted: 2017-08-15

## 2022-06-23 ENCOUNTER — APPOINTMENT (OUTPATIENT)
Dept: GENERAL RADIOLOGY | Age: 67
DRG: 177 | End: 2022-06-23
Attending: EMERGENCY MEDICINE
Payer: MEDICARE

## 2022-06-23 ENCOUNTER — HOSPITAL ENCOUNTER (INPATIENT)
Age: 67
LOS: 3 days | Discharge: HOME | DRG: 177 | End: 2022-06-26
Attending: EMERGENCY MEDICINE | Admitting: STUDENT IN AN ORGANIZED HEALTH CARE EDUCATION/TRAINING PROGRAM
Payer: MEDICARE

## 2022-06-23 ENCOUNTER — APPOINTMENT (OUTPATIENT)
Dept: CT IMAGING | Age: 67
DRG: 177 | End: 2022-06-23
Attending: EMERGENCY MEDICINE
Payer: MEDICARE

## 2022-06-23 DIAGNOSIS — U07.1 COVID-19 VIRUS INFECTION: ICD-10-CM

## 2022-06-23 DIAGNOSIS — G93.40 ENCEPHALOPATHY: ICD-10-CM

## 2022-06-23 DIAGNOSIS — A41.9 SEPSIS, DUE TO UNSPECIFIED ORGANISM, UNSPECIFIED WHETHER ACUTE ORGAN DYSFUNCTION PRESENT (HCC): Primary | ICD-10-CM

## 2022-06-23 LAB
ALBUMIN SERPL-MCNC: 3.7 G/DL (ref 3.5–5)
ALBUMIN/GLOB SERPL: 0.9 {RATIO} (ref 1.1–2.2)
ALP SERPL-CCNC: 98 U/L (ref 45–117)
ALT SERPL-CCNC: 128 U/L (ref 12–78)
AMMONIA PLAS-SCNC: 16 UMOL/L
ANION GAP SERPL CALC-SCNC: 7 MMOL/L (ref 5–15)
APPEARANCE UR: CLEAR
AST SERPL-CCNC: 113 U/L (ref 15–37)
BACTERIA URNS QL MICRO: NEGATIVE /HPF
BASOPHILS # BLD: 0 K/UL (ref 0–0.1)
BASOPHILS NFR BLD: 1 % (ref 0–1)
BILIRUB SERPL-MCNC: 0.5 MG/DL (ref 0.2–1)
BILIRUB UR QL: NEGATIVE
BUN SERPL-MCNC: 14 MG/DL (ref 6–20)
BUN/CREAT SERPL: 12 (ref 12–20)
CALCIUM SERPL-MCNC: 9.6 MG/DL (ref 8.5–10.1)
CHLORIDE SERPL-SCNC: 101 MMOL/L (ref 97–108)
CO2 SERPL-SCNC: 27 MMOL/L (ref 21–32)
COLOR UR: ABNORMAL
COMMENT, HOLDF: NORMAL
COVID-19 RAPID TEST, COVR: DETECTED
CREAT SERPL-MCNC: 1.16 MG/DL (ref 0.7–1.3)
DIFFERENTIAL METHOD BLD: ABNORMAL
EOSINOPHIL # BLD: 0.1 K/UL (ref 0–0.4)
EOSINOPHIL NFR BLD: 1 % (ref 0–7)
EPITH CASTS URNS QL MICRO: ABNORMAL /LPF
ERYTHROCYTE [DISTWIDTH] IN BLOOD BY AUTOMATED COUNT: 14.2 % (ref 11.5–14.5)
ETHANOL SERPL-MCNC: <10 MG/DL
GLOBULIN SER CALC-MCNC: 3.9 G/DL (ref 2–4)
GLUCOSE SERPL-MCNC: 197 MG/DL (ref 65–100)
GLUCOSE UR STRIP.AUTO-MCNC: >1000 MG/DL
HCT VFR BLD AUTO: 39.9 % (ref 36.6–50.3)
HGB BLD-MCNC: 13.1 G/DL (ref 12.1–17)
HGB UR QL STRIP: NEGATIVE
HYALINE CASTS URNS QL MICRO: ABNORMAL /LPF (ref 0–5)
IMM GRANULOCYTES # BLD AUTO: 0 K/UL (ref 0–0.04)
IMM GRANULOCYTES NFR BLD AUTO: 1 % (ref 0–0.5)
INR PPP: 1.1 (ref 0.9–1.1)
KETONES UR QL STRIP.AUTO: 15 MG/DL
LEUKOCYTE ESTERASE UR QL STRIP.AUTO: NEGATIVE
LYMPHOCYTES # BLD: 0.9 K/UL (ref 0.8–3.5)
LYMPHOCYTES NFR BLD: 18 % (ref 12–49)
MAGNESIUM SERPL-MCNC: 1.9 MG/DL (ref 1.6–2.4)
MCH RBC QN AUTO: 30 PG (ref 26–34)
MCHC RBC AUTO-ENTMCNC: 32.8 G/DL (ref 30–36.5)
MCV RBC AUTO: 91.3 FL (ref 80–99)
MONOCYTES # BLD: 0.7 K/UL (ref 0–1)
MONOCYTES NFR BLD: 14 % (ref 5–13)
NEUTS SEG # BLD: 3.3 K/UL (ref 1.8–8)
NEUTS SEG NFR BLD: 65 % (ref 32–75)
NITRITE UR QL STRIP.AUTO: NEGATIVE
NRBC # BLD: 0 K/UL (ref 0–0.01)
NRBC BLD-RTO: 0 PER 100 WBC
PH UR STRIP: 7 [PH] (ref 5–8)
PLATELET # BLD AUTO: 114 K/UL (ref 150–400)
PMV BLD AUTO: 10.5 FL (ref 8.9–12.9)
POTASSIUM SERPL-SCNC: 4.3 MMOL/L (ref 3.5–5.1)
PROCALCITONIN SERPL-MCNC: 0.12 NG/ML
PROT SERPL-MCNC: 7.6 G/DL (ref 6.4–8.2)
PROT UR STRIP-MCNC: ABNORMAL MG/DL
PROTHROMBIN TIME: 11.2 SEC (ref 9–11.1)
RBC # BLD AUTO: 4.37 M/UL (ref 4.1–5.7)
RBC #/AREA URNS HPF: ABNORMAL /HPF (ref 0–5)
SAMPLES BEING HELD,HOLD: NORMAL
SODIUM SERPL-SCNC: 135 MMOL/L (ref 136–145)
SOURCE, COVRS: ABNORMAL
SP GR UR REFRACTOMETRY: 1.02 (ref 1–1.03)
TROPONIN-HIGH SENSITIVITY: 93 NG/L (ref 0–76)
UROBILINOGEN UR QL STRIP.AUTO: 2 EU/DL (ref 0.2–1)
WBC # BLD AUTO: 5.1 K/UL (ref 4.1–11.1)
WBC URNS QL MICRO: ABNORMAL /HPF (ref 0–4)

## 2022-06-23 PROCEDURE — 80053 COMPREHEN METABOLIC PANEL: CPT

## 2022-06-23 PROCEDURE — 70450 CT HEAD/BRAIN W/O DYE: CPT

## 2022-06-23 PROCEDURE — 96361 HYDRATE IV INFUSION ADD-ON: CPT

## 2022-06-23 PROCEDURE — 84484 ASSAY OF TROPONIN QUANT: CPT

## 2022-06-23 PROCEDURE — 82140 ASSAY OF AMMONIA: CPT

## 2022-06-23 PROCEDURE — 99285 EMERGENCY DEPT VISIT HI MDM: CPT

## 2022-06-23 PROCEDURE — 81001 URINALYSIS AUTO W/SCOPE: CPT

## 2022-06-23 PROCEDURE — 36415 COLL VENOUS BLD VENIPUNCTURE: CPT

## 2022-06-23 PROCEDURE — 96360 HYDRATION IV INFUSION INIT: CPT

## 2022-06-23 PROCEDURE — 65270000046 HC RM TELEMETRY

## 2022-06-23 PROCEDURE — 82077 ASSAY SPEC XCP UR&BREATH IA: CPT

## 2022-06-23 PROCEDURE — 84145 PROCALCITONIN (PCT): CPT

## 2022-06-23 PROCEDURE — 87086 URINE CULTURE/COLONY COUNT: CPT

## 2022-06-23 PROCEDURE — 85610 PROTHROMBIN TIME: CPT

## 2022-06-23 PROCEDURE — 71045 X-RAY EXAM CHEST 1 VIEW: CPT

## 2022-06-23 PROCEDURE — 93005 ELECTROCARDIOGRAM TRACING: CPT

## 2022-06-23 PROCEDURE — 85025 COMPLETE CBC W/AUTO DIFF WBC: CPT

## 2022-06-23 PROCEDURE — 74011250637 HC RX REV CODE- 250/637: Performed by: EMERGENCY MEDICINE

## 2022-06-23 PROCEDURE — 87635 SARS-COV-2 COVID-19 AMP PRB: CPT

## 2022-06-23 PROCEDURE — 83735 ASSAY OF MAGNESIUM: CPT

## 2022-06-23 PROCEDURE — 74011250636 HC RX REV CODE- 250/636: Performed by: EMERGENCY MEDICINE

## 2022-06-23 RX ORDER — ACETAMINOPHEN 325 MG/1
650 TABLET ORAL
Status: COMPLETED | OUTPATIENT
Start: 2022-06-23 | End: 2022-06-23

## 2022-06-23 RX ADMIN — SODIUM CHLORIDE 1000 ML: 9 INJECTION, SOLUTION INTRAVENOUS at 17:32

## 2022-06-23 RX ADMIN — ACETAMINOPHEN 650 MG: 325 TABLET ORAL at 17:33

## 2022-06-23 NOTE — ED PROVIDER NOTES
54-year-old male presents from home via EMS with reports of confusion generalized weakness. EMS activated a level 2 code stroke. They state that his last known well was 6 AM this morning. He has been generally weak in both legs and having some difficulty ambulating. He also has been complained of urinary incontinence and slow responses to some questions. They note no focal neurologic deficit on their exam.  Patient's blood sugar was 230. EMS adds that the patient recently had a recent car travel. No further specifics are available at this time awaiting the wife's arrival to bedside. According to EMR, past medical history significant for diabetes, right bundle branch block, hypertension, fatty liver disease, depression, sleep apnea.            Past Medical History:   Diagnosis Date    Arrhythmia     R BUNDLE BRANCH BLOCK    Arthritis     Diabetes ([facility] [address] [facility]     TYPE II, INSULIN DEPENDANT    GERD (gastroesophageal reflux disease)     MARCUS'S ESOPHAGUS    Hypertension     Liver disease     FATTY LIVER    Other ill-defined conditions(799.89)     High Cholesterol    Other ill-defined conditions(799.89)     IRON DEFICIENT ANEMIA    Psychiatric disorder     Depression    Unspecified sleep apnea     CPAP       Past Surgical History:   Procedure Laterality Date    COLONOSCOPY N/A 11/24/2021    COLONOSCOPY AND EGD   :- [facility] Selma Peabody[facility] ENDOSCOPY    HX GI  02/2007    COLONOSCOPY    HX GI  2017    ENDOSCOPY, UPPER    HX HEENT  2002    EYELID REDUCTION    HX KNEE ARTHROSCOPY Left 2014    MENISCUS REPAIR    HX KNEE ARTHROSCOPY Right 2014    HX LAP CHOLECYSTECTOMY  2007    HX OTHER SURGICAL Right 2012    LIPOMA EXCISION UPPER ABD         Family History:   Problem Relation Age of Onset    Hypertension Mother    Libbye Shanika OSTEOARTHRITIS Mother     Alzheimer's Disease Father     Lung Disease Father     Parkinson's Disease Father     No Known Problems Sister     No Known Problems Sister    Vinny Voss Anesth Problems Neg Hx        Social History     Socioeconomic History    Marital status:      Spouse name: Not on file    Number of children: Not on file    Years of education: Not on file    Highest education level: Not on file   Occupational History    Not on file   Tobacco Use    Smoking status: Never Smoker    Smokeless tobacco: Never Used   Substance and Sexual Activity    Alcohol use: Yes     Alcohol/week: 2.0 standard drinks     Types: 2 Cans of beer per week    Drug use: No    Sexual activity: Yes     Partners: Female   Other Topics Concern    Not on file   Social History Narrative    Not on file     Social Determinants of Health     Financial Resource Strain:     Difficulty of Paying Living Expenses: Not on file   Food Insecurity:     Worried About Running Out of Food in the Last Year: Not on file    Bautista of Food in the Last Year: Not on file   Transportation Needs:     Lack of Transportation (Medical): Not on file    Lack of Transportation (Non-Medical):  Not on file   Physical Activity:     Days of Exercise per Week: Not on file    Minutes of Exercise per Session: Not on file   Stress:     Feeling of Stress : Not on file   Social Connections:     Frequency of Communication with Friends and Family: Not on file    Frequency of Social Gatherings with Friends and Family: Not on file    Attends Jew Services: Not on file    Active Member of [address] [place] [address] [employer] or Organizations: Not on file    Attends Club or Organization Meetings: Not on file    Marital Status: Not on file   Intimate Partner Violence:     Fear of Current or Ex-Partner: Not on file    Emotionally Abused: Not on file    Physically Abused: Not on file    Sexually Abused: Not on file   Housing Stability:     Unable to Pay for Housing in the Last Year: Not on file    Number of [place] in the Last Year: Not on file    Unstable Housing in the Last Year: Not on file         ALLERGIES: Patient has no known allergies. Review of Systems   Constitutional: Negative for diaphoresis and fever. HENT: Negative for facial swelling. Eyes: Negative for visual disturbance. Respiratory: Negative for cough. Cardiovascular: Negative for chest pain. Gastrointestinal: Negative for abdominal pain. Genitourinary: Negative for dysuria. Musculoskeletal: Negative for joint swelling. Skin: Negative for rash. Neurological: Negative for headaches. Hematological: Negative for adenopathy. Psychiatric/Behavioral: Negative for suicidal ideas. Vitals:    06/23/22 1717   BP: (!) 155/6   Pulse: 95   Resp: 17   Temp: (!) 100.6 °F (38.1 °C)   SpO2: 93%            Physical Exam  Vitals and nursing note reviewed. Constitutional:       General: He is not in acute distress. Appearance: He is well-developed. HENT:      Head: Normocephalic and atraumatic. Eyes:      Pupils: Pupils are equal, round, and reactive to light. Cardiovascular:      Rate and Rhythm: Normal rate. Pulmonary:      Effort: Pulmonary effort is normal. No respiratory distress. Abdominal:      General: There is no distension. Musculoskeletal:         General: Normal range of motion. Cervical back: Normal range of motion and neck supple. Skin:     General: Skin is warm and dry. Neurological:      Mental Status: He is alert and oriented to person, place, and time. MDM  Number of Diagnoses or Management Options     Amount and/or Complexity of Data Reviewed  Clinical lab tests: reviewed  Tests in the radiology section of CPT®: reviewed  Tests in the medicine section of CPT®: reviewed      ED Course as of 06/23/22 1901   Thu Jun 23, 2022   1752 EKG, 12 LEAD, INITIAL  ED EKG interpretation:  Rhythm: normal sinus rhythm. Rate (approx.): 94. Axis: normal.  ST segment:  No concerning ST elevations or depressions. RBBB. This EKG was interpreted by Shelly Campbell MD,ED Provider.      [JM]      ED Course User Index  [JM] Bishop Bryan Jorge Deal MD     Perfect Serve Consult for Admission  7:01 PM    ED Room Number: ER30/30  Patient Name and age: Vonnie Cross 75 y.o.  male  Working Diagnosis:   1. Sepsis, due to unspecified organism, unspecified whether acute organ dysfunction present ([facility] [address]    2. COVID-19 virus infection    3. Encephalopathy        COVID-19 Suspicion:  yes  Sepsis present:  yes  Reassessment needed: no  Code Status:  Full Code  Readmission: no  Isolation Requirements:  yes  Recommended Level of Care:  telemetry  Department: [address] [place] [address] Adult ED - [phone]  Admitting Provider:     Other:  Came in as code stroke for confusion and generalized weakness. HCT neg. Febrile and tachy. COVID positive. Not hypoxic. Total critical care time spent exclusive of procedures:  35 minutes.     Procedures

## 2022-06-24 ENCOUNTER — APPOINTMENT (OUTPATIENT)
Dept: MRI IMAGING | Age: 67
DRG: 177 | End: 2022-06-24
Attending: STUDENT IN AN ORGANIZED HEALTH CARE EDUCATION/TRAINING PROGRAM
Payer: MEDICARE

## 2022-06-24 ENCOUNTER — APPOINTMENT (OUTPATIENT)
Dept: NON INVASIVE DIAGNOSTICS | Age: 67
DRG: 177 | End: 2022-06-24
Attending: STUDENT IN AN ORGANIZED HEALTH CARE EDUCATION/TRAINING PROGRAM
Payer: MEDICARE

## 2022-06-24 PROBLEM — E66.9 OBESITY: Status: ACTIVE | Noted: 2022-06-24

## 2022-06-24 PROBLEM — E78.5 HYPERLIPIDEMIA: Status: ACTIVE | Noted: 2022-06-24

## 2022-06-24 PROBLEM — G47.30 SLEEP APNEA: Status: ACTIVE | Noted: 2020-12-11

## 2022-06-24 PROBLEM — I10 ESSENTIAL HYPERTENSION: Status: ACTIVE | Noted: 2022-06-24

## 2022-06-24 PROBLEM — E11.40 DIABETIC NEUROPATHY (HCC): Status: ACTIVE | Noted: 2020-12-11

## 2022-06-24 LAB
ALBUMIN SERPL-MCNC: 3.3 G/DL (ref 3.5–5)
ALBUMIN/GLOB SERPL: 1 {RATIO} (ref 1.1–2.2)
ALP SERPL-CCNC: 85 U/L (ref 45–117)
ALT SERPL-CCNC: 109 U/L (ref 12–78)
AMPHET UR QL SCN: NEGATIVE
ANION GAP SERPL CALC-SCNC: 4 MMOL/L (ref 5–15)
AST SERPL-CCNC: 94 U/L (ref 15–37)
ATRIAL RATE: 94 BPM
BACTERIA SPEC CULT: NORMAL
BARBITURATES UR QL SCN: NEGATIVE
BENZODIAZ UR QL: NEGATIVE
BILIRUB SERPL-MCNC: 0.4 MG/DL (ref 0.2–1)
BUN SERPL-MCNC: 10 MG/DL (ref 6–20)
BUN/CREAT SERPL: 11 (ref 12–20)
CALCIUM SERPL-MCNC: 9.1 MG/DL (ref 8.5–10.1)
CALCULATED P AXIS, ECG09: 65 DEGREES
CALCULATED R AXIS, ECG10: 74 DEGREES
CALCULATED T AXIS, ECG11: 39 DEGREES
CANNABINOIDS UR QL SCN: NEGATIVE
CHLORIDE SERPL-SCNC: 102 MMOL/L (ref 97–108)
CO2 SERPL-SCNC: 29 MMOL/L (ref 21–32)
COCAINE UR QL SCN: NEGATIVE
COMMENT, HOLDF: NORMAL
CREAT SERPL-MCNC: 0.94 MG/DL (ref 0.7–1.3)
DIAGNOSIS, 93000: NORMAL
DRUG SCRN COMMENT,DRGCM: NORMAL
ECHO AV AREA PEAK VELOCITY: 2.9 CM2
ECHO AV AREA PEAK VELOCITY: 3.3 CM2
ECHO AV PEAK GRADIENT: 6 MMHG
ECHO AV PEAK VELOCITY: 1.3 M/S
ECHO LA DIAMETER INDEX: 1.88 CM/M2
ECHO LA DIAMETER: 4.4 CM
ECHO LA VOL 2C: 64 ML (ref 18–58)
ECHO LA VOL 4C: 53 ML (ref 18–58)
ECHO LA VOLUME AREA LENGTH: 67 ML
ECHO LA VOLUME INDEX A2C: 27 ML/M2 (ref 16–34)
ECHO LA VOLUME INDEX A4C: 23 ML/M2 (ref 16–34)
ECHO LA VOLUME INDEX AREA LENGTH: 29 ML/M2 (ref 16–34)
ECHO LV FRACTIONAL SHORTENING: 39 % (ref 28–44)
ECHO LV INTERNAL DIMENSION DIASTOLE INDEX: 2.09 CM/M2
ECHO LV INTERNAL DIMENSION DIASTOLIC: 4.9 CM (ref 4.2–5.9)
ECHO LV INTERNAL DIMENSION SYSTOLIC INDEX: 1.28 CM/M2
ECHO LV INTERNAL DIMENSION SYSTOLIC: 3 CM
ECHO LV IVSD: 1.7 CM (ref 0.6–1)
ECHO LV MASS 2D: 361.5 G (ref 88–224)
ECHO LV MASS INDEX 2D: 154.5 G/M2 (ref 49–115)
ECHO LV POSTERIOR WALL DIASTOLIC: 1.6 CM (ref 0.6–1)
ECHO LV RELATIVE WALL THICKNESS RATIO: 0.65
ECHO LVOT AREA: 3.1 CM2
ECHO LVOT DIAM: 2 CM
ECHO LVOT MEAN GRADIENT: 3 MMHG
ECHO LVOT PEAK GRADIENT: 5 MMHG
ECHO LVOT PEAK GRADIENT: 7 MMHG
ECHO LVOT PEAK VELOCITY: 1.1 M/S
ECHO LVOT PEAK VELOCITY: 1.3 M/S
ECHO LVOT STROKE VOLUME INDEX: 33.3 ML/M2
ECHO LVOT SV: 77.9 ML
ECHO LVOT VTI: 24.8 CM
ECHO MV A VELOCITY: 0.88 M/S
ECHO MV AREA PHT: 3.6 CM2
ECHO MV E DECELERATION TIME (DT): 212.4 MS
ECHO MV E VELOCITY: 0.79 M/S
ECHO MV E/A RATIO: 0.9
ECHO MV PRESSURE HALF TIME (PHT): 61.6 MS
ECHO PV MAX VELOCITY: 0.9 M/S
ECHO PV PEAK GRADIENT: 3 MMHG
ECHO RV INTERNAL DIMENSION: 3.8 CM
ERYTHROCYTE [DISTWIDTH] IN BLOOD BY AUTOMATED COUNT: 14.2 % (ref 11.5–14.5)
FLUAV AG NPH QL IA: NEGATIVE
FLUBV AG NOSE QL IA: NEGATIVE
GLOBULIN SER CALC-MCNC: 3.4 G/DL (ref 2–4)
GLUCOSE BLD STRIP.AUTO-MCNC: 119 MG/DL (ref 65–117)
GLUCOSE BLD STRIP.AUTO-MCNC: 181 MG/DL (ref 65–117)
GLUCOSE BLD STRIP.AUTO-MCNC: 218 MG/DL (ref 65–117)
GLUCOSE SERPL-MCNC: 172 MG/DL (ref 65–100)
HCT VFR BLD AUTO: 36.5 % (ref 36.6–50.3)
HGB BLD-MCNC: 11.8 G/DL (ref 12.1–17)
MCH RBC QN AUTO: 29.8 PG (ref 26–34)
MCHC RBC AUTO-ENTMCNC: 32.3 G/DL (ref 30–36.5)
MCV RBC AUTO: 92.2 FL (ref 80–99)
METHADONE UR QL: NEGATIVE
NRBC # BLD: 0 K/UL (ref 0–0.01)
NRBC BLD-RTO: 0 PER 100 WBC
OPIATES UR QL: NEGATIVE
P-R INTERVAL, ECG05: 138 MS
PCP UR QL: NEGATIVE
PLATELET # BLD AUTO: 93 K/UL (ref 150–400)
PMV BLD AUTO: 10.1 FL (ref 8.9–12.9)
POTASSIUM SERPL-SCNC: 3.7 MMOL/L (ref 3.5–5.1)
PROT SERPL-MCNC: 6.7 G/DL (ref 6.4–8.2)
Q-T INTERVAL, ECG07: 364 MS
QRS DURATION, ECG06: 140 MS
QTC CALCULATION (BEZET), ECG08: 455 MS
RBC # BLD AUTO: 3.96 M/UL (ref 4.1–5.7)
SAMPLES BEING HELD,HOLD: NORMAL
SERVICE CMNT-IMP: ABNORMAL
SERVICE CMNT-IMP: NORMAL
SODIUM SERPL-SCNC: 135 MMOL/L (ref 136–145)
VENTRICULAR RATE, ECG03: 94 BPM
WBC # BLD AUTO: 3.7 K/UL (ref 4.1–11.1)

## 2022-06-24 PROCEDURE — 74011636637 HC RX REV CODE- 636/637: Performed by: STUDENT IN AN ORGANIZED HEALTH CARE EDUCATION/TRAINING PROGRAM

## 2022-06-24 PROCEDURE — 74011000250 HC RX REV CODE- 250: Performed by: STUDENT IN AN ORGANIZED HEALTH CARE EDUCATION/TRAINING PROGRAM

## 2022-06-24 PROCEDURE — 36415 COLL VENOUS BLD VENIPUNCTURE: CPT

## 2022-06-24 PROCEDURE — 82962 GLUCOSE BLOOD TEST: CPT

## 2022-06-24 PROCEDURE — 92610 EVALUATE SWALLOWING FUNCTION: CPT

## 2022-06-24 PROCEDURE — A9576 INJ PROHANCE MULTIPACK: HCPCS

## 2022-06-24 PROCEDURE — 85027 COMPLETE CBC AUTOMATED: CPT

## 2022-06-24 PROCEDURE — 74011250636 HC RX REV CODE- 250/636: Performed by: STUDENT IN AN ORGANIZED HEALTH CARE EDUCATION/TRAINING PROGRAM

## 2022-06-24 PROCEDURE — 86710 INFLUENZA VIRUS ANTIBODY: CPT

## 2022-06-24 PROCEDURE — 87804 INFLUENZA ASSAY W/OPTIC: CPT

## 2022-06-24 PROCEDURE — 70553 MRI BRAIN STEM W/O & W/DYE: CPT

## 2022-06-24 PROCEDURE — 74011250637 HC RX REV CODE- 250/637: Performed by: STUDENT IN AN ORGANIZED HEALTH CARE EDUCATION/TRAINING PROGRAM

## 2022-06-24 PROCEDURE — 80307 DRUG TEST PRSMV CHEM ANLYZR: CPT

## 2022-06-24 PROCEDURE — 74011000250 HC RX REV CODE- 250: Performed by: HOSPITALIST

## 2022-06-24 PROCEDURE — 74011250637 HC RX REV CODE- 250/637: Performed by: HOSPITALIST

## 2022-06-24 PROCEDURE — 74011250636 HC RX REV CODE- 250/636

## 2022-06-24 PROCEDURE — 99222 1ST HOSP IP/OBS MODERATE 55: CPT | Performed by: PSYCHIATRY & NEUROLOGY

## 2022-06-24 PROCEDURE — 80053 COMPREHEN METABOLIC PANEL: CPT

## 2022-06-24 PROCEDURE — C8929 TTE W OR WO FOL WCON,DOPPLER: HCPCS

## 2022-06-24 PROCEDURE — 74011250636 HC RX REV CODE- 250/636: Performed by: HOSPITALIST

## 2022-06-24 PROCEDURE — 93306 TTE W/DOPPLER COMPLETE: CPT | Performed by: INTERNAL MEDICINE

## 2022-06-24 PROCEDURE — 65270000046 HC RM TELEMETRY

## 2022-06-24 RX ORDER — OMEPRAZOLE 40 MG/1
40 CAPSULE, DELAYED RELEASE ORAL
COMMUNITY
Start: 2022-03-27

## 2022-06-24 RX ORDER — QUINAPRIL 40 MG/1
40 TABLET ORAL 2 TIMES DAILY
COMMUNITY
End: 2022-06-24

## 2022-06-24 RX ORDER — ONDANSETRON 2 MG/ML
4 INJECTION INTRAMUSCULAR; INTRAVENOUS
Status: DISCONTINUED | OUTPATIENT
Start: 2022-06-24 | End: 2022-06-26 | Stop reason: HOSPADM

## 2022-06-24 RX ORDER — SODIUM CHLORIDE 0.9 % (FLUSH) 0.9 %
5-40 SYRINGE (ML) INJECTION EVERY 8 HOURS
Status: DISCONTINUED | OUTPATIENT
Start: 2022-06-24 | End: 2022-06-26 | Stop reason: HOSPADM

## 2022-06-24 RX ORDER — DULOXETIN HYDROCHLORIDE 60 MG/1
60 CAPSULE, DELAYED RELEASE ORAL DAILY
COMMUNITY
Start: 2022-03-18

## 2022-06-24 RX ORDER — GABAPENTIN 400 MG/1
800 CAPSULE ORAL 2 TIMES DAILY
COMMUNITY
Start: 2022-05-09

## 2022-06-24 RX ORDER — SODIUM CHLORIDE, SODIUM LACTATE, POTASSIUM CHLORIDE, CALCIUM CHLORIDE 600; 310; 30; 20 MG/100ML; MG/100ML; MG/100ML; MG/100ML
100 INJECTION, SOLUTION INTRAVENOUS CONTINUOUS
Status: DISCONTINUED | OUTPATIENT
Start: 2022-06-24 | End: 2022-06-26 | Stop reason: HOSPADM

## 2022-06-24 RX ORDER — ROSUVASTATIN CALCIUM 40 MG/1
40 TABLET, COATED ORAL DAILY
COMMUNITY
End: 2022-06-24

## 2022-06-24 RX ORDER — MONTELUKAST SODIUM 10 MG/1
10 TABLET ORAL
COMMUNITY
Start: 2022-04-27

## 2022-06-24 RX ORDER — ONDANSETRON 4 MG/1
4 TABLET, ORALLY DISINTEGRATING ORAL
Status: DISCONTINUED | OUTPATIENT
Start: 2022-06-24 | End: 2022-06-26 | Stop reason: HOSPADM

## 2022-06-24 RX ORDER — ASCORBIC ACID 500 MG
1000 TABLET ORAL DAILY
Status: DISCONTINUED | OUTPATIENT
Start: 2022-06-24 | End: 2022-06-26 | Stop reason: HOSPADM

## 2022-06-24 RX ORDER — BISMUTH SUBSALICYLATE 262 MG/15ML
30 LIQUID ORAL
Status: DISCONTINUED | OUTPATIENT
Start: 2022-06-24 | End: 2022-06-26 | Stop reason: HOSPADM

## 2022-06-24 RX ORDER — ESCITALOPRAM OXALATE 10 MG/1
30 TABLET ORAL DAILY
Status: DISCONTINUED | OUTPATIENT
Start: 2022-06-24 | End: 2022-06-24

## 2022-06-24 RX ORDER — ROSUVASTATIN CALCIUM 40 MG/1
40 TABLET, COATED ORAL
Status: DISCONTINUED | OUTPATIENT
Start: 2022-06-24 | End: 2022-06-26 | Stop reason: HOSPADM

## 2022-06-24 RX ORDER — ACETAMINOPHEN 325 MG/1
650 TABLET ORAL
Status: DISCONTINUED | OUTPATIENT
Start: 2022-06-24 | End: 2022-06-26 | Stop reason: HOSPADM

## 2022-06-24 RX ORDER — SODIUM CHLORIDE, SODIUM LACTATE, POTASSIUM CHLORIDE, CALCIUM CHLORIDE 600; 310; 30; 20 MG/100ML; MG/100ML; MG/100ML; MG/100ML
75 INJECTION, SOLUTION INTRAVENOUS CONTINUOUS
Status: DISCONTINUED | OUTPATIENT
Start: 2022-06-24 | End: 2022-06-24

## 2022-06-24 RX ORDER — LISINOPRIL 20 MG/1
40 TABLET ORAL DAILY
Status: DISCONTINUED | OUTPATIENT
Start: 2022-06-24 | End: 2022-06-25

## 2022-06-24 RX ORDER — FLUTICASONE PROPIONATE 50 MCG
2 SPRAY, SUSPENSION (ML) NASAL DAILY
Status: DISCONTINUED | OUTPATIENT
Start: 2022-06-24 | End: 2022-06-26 | Stop reason: HOSPADM

## 2022-06-24 RX ORDER — SODIUM CHLORIDE 9 MG/ML
75 INJECTION, SOLUTION INTRAVENOUS CONTINUOUS
Status: DISCONTINUED | OUTPATIENT
Start: 2022-06-24 | End: 2022-06-24

## 2022-06-24 RX ORDER — CODEINE PHOSPHATE AND GUAIFENESIN 10; 100 MG/5ML; MG/5ML
10 SOLUTION ORAL
Status: DISCONTINUED | OUTPATIENT
Start: 2022-06-24 | End: 2022-06-26 | Stop reason: HOSPADM

## 2022-06-24 RX ORDER — ZINC SULFATE 50(220)MG
1 CAPSULE ORAL DAILY
Status: DISCONTINUED | OUTPATIENT
Start: 2022-06-24 | End: 2022-06-26 | Stop reason: HOSPADM

## 2022-06-24 RX ORDER — INSULIN LISPRO 100 [IU]/ML
10 INJECTION, SOLUTION INTRAVENOUS; SUBCUTANEOUS
Status: DISCONTINUED | OUTPATIENT
Start: 2022-06-24 | End: 2022-06-26 | Stop reason: HOSPADM

## 2022-06-24 RX ORDER — BENZONATATE 100 MG/1
100 CAPSULE ORAL
Status: DISCONTINUED | OUTPATIENT
Start: 2022-06-24 | End: 2022-06-26 | Stop reason: HOSPADM

## 2022-06-24 RX ORDER — SODIUM CHLORIDE 0.9 % (FLUSH) 0.9 %
5-40 SYRINGE (ML) INJECTION AS NEEDED
Status: DISCONTINUED | OUTPATIENT
Start: 2022-06-24 | End: 2022-06-26 | Stop reason: HOSPADM

## 2022-06-24 RX ORDER — BISACODYL 5 MG/1
1 TABLET, COATED ORAL DAILY
COMMUNITY

## 2022-06-24 RX ORDER — AMOXICILLIN 250 MG
1 CAPSULE ORAL 2 TIMES DAILY
Status: DISCONTINUED | OUTPATIENT
Start: 2022-06-24 | End: 2022-06-26 | Stop reason: HOSPADM

## 2022-06-24 RX ORDER — MELATONIN 5 MG
5 CAPSULE ORAL
COMMUNITY

## 2022-06-24 RX ORDER — METFORMIN HYDROCHLORIDE 500 MG/1
2 TABLET ORAL 2 TIMES DAILY
COMMUNITY
Start: 2022-04-02

## 2022-06-24 RX ORDER — GLUCOSAMINE SULFATE 1500 MG
2000 POWDER IN PACKET (EA) ORAL DAILY
COMMUNITY

## 2022-06-24 RX ORDER — FAMOTIDINE 20 MG/1
20 TABLET, FILM COATED ORAL 2 TIMES DAILY
Status: DISCONTINUED | OUTPATIENT
Start: 2022-06-24 | End: 2022-06-26 | Stop reason: HOSPADM

## 2022-06-24 RX ORDER — FACIAL-BODY WIPES
10 EACH TOPICAL DAILY PRN
Status: DISCONTINUED | OUTPATIENT
Start: 2022-06-24 | End: 2022-06-26 | Stop reason: HOSPADM

## 2022-06-24 RX ORDER — POLYETHYLENE GLYCOL 3350 17 G/17G
17 POWDER, FOR SOLUTION ORAL DAILY
Status: DISCONTINUED | OUTPATIENT
Start: 2022-06-24 | End: 2022-06-26 | Stop reason: HOSPADM

## 2022-06-24 RX ORDER — ENOXAPARIN SODIUM 100 MG/ML
30 INJECTION SUBCUTANEOUS EVERY 12 HOURS
Status: DISCONTINUED | OUTPATIENT
Start: 2022-06-24 | End: 2022-06-26 | Stop reason: HOSPADM

## 2022-06-24 RX ORDER — ALBUTEROL SULFATE 90 UG/1
2 AEROSOL, METERED RESPIRATORY (INHALATION) AS NEEDED
COMMUNITY
Start: 2022-06-16

## 2022-06-24 RX ORDER — ACETAMINOPHEN 650 MG/1
650 SUPPOSITORY RECTAL
Status: DISCONTINUED | OUTPATIENT
Start: 2022-06-24 | End: 2022-06-26 | Stop reason: HOSPADM

## 2022-06-24 RX ORDER — GABAPENTIN 400 MG/1
800 CAPSULE ORAL EVERY 12 HOURS
Status: DISCONTINUED | OUTPATIENT
Start: 2022-06-24 | End: 2022-06-26 | Stop reason: HOSPADM

## 2022-06-24 RX ORDER — HYDRALAZINE HYDROCHLORIDE 20 MG/ML
10 INJECTION INTRAMUSCULAR; INTRAVENOUS
Status: DISCONTINUED | OUTPATIENT
Start: 2022-06-24 | End: 2022-06-26 | Stop reason: HOSPADM

## 2022-06-24 RX ORDER — SODIUM CHLORIDE 0.9 % (FLUSH) 0.9 %
10 SYRINGE (ML) INJECTION
Status: COMPLETED | OUTPATIENT
Start: 2022-06-24 | End: 2022-06-24

## 2022-06-24 RX ORDER — ALBUTEROL SULFATE 90 UG/1
2 AEROSOL, METERED RESPIRATORY (INHALATION)
Status: DISCONTINUED | OUTPATIENT
Start: 2022-06-24 | End: 2022-06-26 | Stop reason: HOSPADM

## 2022-06-24 RX ORDER — BUPROPION HYDROCHLORIDE 150 MG/1
150 TABLET ORAL DAILY
COMMUNITY
Start: 2022-06-15

## 2022-06-24 RX ORDER — ADHESIVE BANDAGE
30 BANDAGE TOPICAL DAILY PRN
Status: DISCONTINUED | OUTPATIENT
Start: 2022-06-24 | End: 2022-06-26 | Stop reason: HOSPADM

## 2022-06-24 RX ADMIN — FAMOTIDINE 20 MG: 20 TABLET, FILM COATED ORAL at 17:43

## 2022-06-24 RX ADMIN — SODIUM CHLORIDE, PRESERVATIVE FREE 10 ML: 5 INJECTION INTRAVENOUS at 13:43

## 2022-06-24 RX ADMIN — SODIUM CHLORIDE, PRESERVATIVE FREE 10 ML: 5 INJECTION INTRAVENOUS at 05:48

## 2022-06-24 RX ADMIN — GADOTERIDOL 20 ML: 279.3 INJECTION, SOLUTION INTRAVENOUS at 05:48

## 2022-06-24 RX ADMIN — Medication 1 CAPSULE: at 13:42

## 2022-06-24 RX ADMIN — Medication 10 UNITS: at 12:44

## 2022-06-24 RX ADMIN — BENZONATATE 100 MG: 100 CAPSULE ORAL at 04:01

## 2022-06-24 RX ADMIN — SODIUM CHLORIDE, POTASSIUM CHLORIDE, SODIUM LACTATE AND CALCIUM CHLORIDE 100 ML/HR: 600; 310; 30; 20 INJECTION, SOLUTION INTRAVENOUS at 13:42

## 2022-06-24 RX ADMIN — SENNOSIDES AND DOCUSATE SODIUM 1 TABLET: 50; 8.6 TABLET ORAL at 21:35

## 2022-06-24 RX ADMIN — SODIUM CHLORIDE, PRESERVATIVE FREE 10 ML: 5 INJECTION INTRAVENOUS at 21:35

## 2022-06-24 RX ADMIN — ENOXAPARIN SODIUM 30 MG: 100 INJECTION SUBCUTANEOUS at 12:36

## 2022-06-24 RX ADMIN — ENOXAPARIN SODIUM 30 MG: 100 INJECTION SUBCUTANEOUS at 21:35

## 2022-06-24 RX ADMIN — BENZONATATE 100 MG: 100 CAPSULE ORAL at 21:35

## 2022-06-24 RX ADMIN — ACETAMINOPHEN 650 MG: 325 TABLET ORAL at 21:35

## 2022-06-24 RX ADMIN — GUAIFENESIN AND CODEINE PHOSPHATE 10 ML: 100; 10 SOLUTION ORAL at 19:26

## 2022-06-24 RX ADMIN — GABAPENTIN 800 MG: 400 CAPSULE ORAL at 21:35

## 2022-06-24 RX ADMIN — PERFLUTREN 1 ML: 6.52 INJECTION, SUSPENSION INTRAVENOUS at 16:13

## 2022-06-24 RX ADMIN — OXYCODONE HYDROCHLORIDE AND ACETAMINOPHEN 1000 MG: 500 TABLET ORAL at 13:41

## 2022-06-24 RX ADMIN — Medication 10 UNITS: at 17:43

## 2022-06-24 RX ADMIN — GABAPENTIN 800 MG: 400 CAPSULE ORAL at 12:37

## 2022-06-24 RX ADMIN — FAMOTIDINE 20 MG: 10 INJECTION, SOLUTION INTRAVENOUS at 12:33

## 2022-06-24 RX ADMIN — ROSUVASTATIN CALCIUM 40 MG: 10 TABLET, FILM COATED ORAL at 21:35

## 2022-06-24 NOTE — ED NOTES
Pt arrives w/ EMS w/ complaints of B/L LE weakness, incontinence, and delayed responses. Per EMS BS: 237. Per EMS VSS in route HR: 90, BP: 166/83,. Pt reports waking up w/ weakness last known well 2030.  Code stroke canceled per Dr. Bishop Bryan

## 2022-06-24 NOTE — PROGRESS NOTES
Occupational Therapy Note:     Pt admitted overnight for generalized weakness and initially a Code Stroke level 2. PT/OT orders received but pt also remains with active bedrest orders and is awaiting medical work up (H&P not in chart yet). Will defer at this time and follow up as able/appropriate.        Janak Montes, OT

## 2022-06-24 NOTE — H&P
PLEASE NOTE: I HAVE GENERATED THIS NOTE WITH THE ASSISTANCE OF VOICE-RECOGNITION TECHNOLOGY. PLEASE EXCUSE ANY SPELLING, GRAMMATICAL, AND SYNTAX ERRORS YOU MAY FIND. IF YOU NEED CLARIFICATION ON ANYTHING, PLEASE FEEL FREE TO REACH OUT TO ME.  [address] [facility],[address]. [facility]  [facility]ospitalist [facility]  History and Physical - Dr. Bettina Jewell    Primary Care Provider: Adriana Cardoza MD  Date of Service:  See Date Note Was Originated    Chief Complaint: Generalized weakness    Subjective:     75 y.o. male presents with several hours duration of abrupt onset, gradual worsening, severe, constant, generalized confusion and weakness that is associated with some urinary incontinence, but otherwise no other symptoms, remitted by nothing, exacerbated by nothing. When I saw the patient at bedside, he complained of a mild cough and asked for something for the cough, but otherwise did not have any other symptoms at that time. From my evaluation, the patient no longer seemed confused or altered. However, I was not able to speak to the family, and so ordered a stroke work-up. Review of Systems:  12 point ROS obtained and otherwise negative, except as per HPI and above.     Past Medical History:   Diagnosis Date    Arrhythmia     R BUNDLE BRANCH BLOCK    Arthritis     Diabetes ([facility] [address]     TYPE II, INSULIN DEPENDANT    GERD (gastroesophageal reflux disease)     MARCUS'S ESOPHAGUS    Hypertension     Liver disease     FATTY LIVER    Other ill-defined conditions(799.89)     High Cholesterol    Other ill-defined conditions(799.89)     IRON DEFICIENT ANEMIA    Psychiatric disorder     Depression    Unspecified sleep apnea     CPAP      Past Surgical History:   Procedure Laterality Date    COLONOSCOPY N/A 11/24/2021    COLONOSCOPY AND EGD   :- performed by Mandi Aiken MD at [address] HX GI  02/2007    COLONOSCOPY    HX GI  2017    ENDOSCOPY, UPPER    HX HEENT  2002    EYELID REDUCTION    HX KNEE ARTHROSCOPY Left 2014    MENISCUS REPAIR    HX KNEE ARTHROSCOPY Right 2014    HX LAP CHOLECYSTECTOMY  2007    HX OTHER SURGICAL Right 2012    LIPOMA EXCISION UPPER ABD     Prior to Admission medications    Medication Sig Start Date End Date Taking? Authorizing Provider   gabapentin (NEURONTIN) 300 mg capsule Take 300 mg by mouth three (3) times daily. Indications: neuropathic pain    Provider, Historical   famotidine (PEPCID) 20 mg tablet Take 20 mg by mouth two (2) times a day. Indications: gastroesophageal reflux disease    Provider, Historical   fluticasone propionate (FLONASE ALLERGY RELIEF) 50 mcg/actuation nasal spray 2 Sprays by Both Nostrils route daily. Indications: inflammation of the nose due to an allergy    Provider, Historical   insulin lispro (HUMALOG) 100 unit/mL injection 10 Units by SubCUTAneous route two (2) times a day. LUNCH AND DINNER    Provider, Historical   empagliflozin (JARDIANCE) 10 mg tablet Take 10 mg by mouth daily. Patient not taking: Reported on 11/24/2021    Provider, Historical   felodipine (PLENDIL SR) 5 mg 24 hr tablet Take 5 mg by mouth daily. Other, MD Jamie   diphenhydramine-acetaminophen (EXCEDRIN PM)  mg Tab Take 2 Tabs by mouth daily as needed. Patient not taking: Reported on 11/24/2021    Provider, Historical   Cholecalciferol, Vitamin D3, 2,000 unit cap Take 2,000 Units by mouth daily. Provider, Historical   escitalopram (LEXAPRO) 20 mg tablet Take 30 mg by mouth daily. Takes 30 mg daily    Provider, Historical   quinapril (ACCUPRIL) 40 mg tablet Take 40 mg by mouth two (2) times a day. Provider, Historical   rosuvastatin (CRESTOR) 40 mg tablet Take 40 mg by mouth nightly.  NON FORMULARY     Provider, Historical     No Known Allergies   Family History   Problem Relation Age of Onset    Hypertension Mother    [facility] OSTEOARTHRITIS Mother     Alzheimer's Disease Father     Lung Disease Father     Parkinson's Disease Father     No Known Problems Sister     No Known Problems Sister     Anesth Problems Neg Hx    . Social History     Socioeconomic History    Marital status:    Tobacco Use    Smoking status: Never Smoker    Smokeless tobacco: Never Used   Substance and Sexual Activity    Alcohol use: Yes     Alcohol/week: 2.0 standard drinks     Types: 2 Cans of beer per week    Drug use: No    Sexual activity: Yes     Partners: Female   . Objective:     Physical Exam:     VS as below    Const'l:          Obese body habitus, a&o, no acute distress  Head/Neck:       neck supple, no cervical/head mass  Eyes:     nonicteric sclera, eom intact  ENT:      auditory acuity grossly intact either with or without device, no nasal deformity  Cardio:           Tachycardic rate regular rhythm  Pulm:     no accessory muscle use, clear tab  Abd:       S NT ND  Derm:     no rashes, no ulcers, no lesions  Extr:      No edema, no cyanosis, no calf tenderness, no varicosities  Neuro:    cn II-XII grossly intact, muscle strength intact  Psych:   mood very pleasant, judgement intact    Data Review: All diagnostic labs and studies have been reviewed.     .    Assessment:     Active Problems:    Tear of meniscus of left knee (6/10/2014)      Tear of meniscus of right knee (10/7/2014)      Cardiac murmur (8/15/2017)      Sepsis ([address] (6/23/2022)      Essential hypertension (6/24/2022)      Diabetic neuropathy ([address] (12/11/2020)      Hyperlipidemia (6/24/2022)      Obesity (6/24/2022)      Sleep apnea (12/11/2020)    Code stroke      Plan:     Acute metabolic encephalopathy  -We will commit stroke work-up  -Echocardiogram, brain MRI  -Neurology consult  -Neurochecks  -Permissive hypertension    Essential hypertension  -Hold home hypertensive medications    Diabetes  -Continue home insulin    Diabetic neuropathy  -Continue home gabapentin    Continue rest of medications as per orders    If code status was discussed with the patient, then code status entered as per the orders  Signed By: Yoko Mckinley DO

## 2022-06-24 NOTE — CONSULTS
INPATIENT NEUROLOGY CONSULTATION  6/24/2022     Consulted by: Delroy Roper DO        Patient ID:  Sean Sarah  [id number]  75 y.o.  1955    CC: Confusion and weakness    HPI    Shilpi Mackey is a 58-year-old gentleman with a history of bundle branch block, diabetes, sleep apnea, hyperlipidemia who was brought to the hospital because he had confusion and weakness and some urinary incontinence. He was brought from home via EMS. He is not a very good historian. He can tell me that he got back to [place] 2 days ago after driving back from [place] with his family for vacation. He has been feeling well in the past few days. He feels confused. He is COVID-positive. He has a history of urinary issues at baseline. He is being admitted for sepsis and acute encephalopathy. Neurology involved to evaluate for possible stroke. MRI brain done this morning is benign. No stroke. No contrast-enhancement to suggest any active inflammation or infection either. Blood work with elevated LFTs. Looking at the medical record reportedly he takes Cymbalta and gabapentin as an outpatient. Review of Systems   Unable to perform ROS: Mental status change   Neurological: Positive for weakness.        Past Medical History:   Diagnosis Date    Arrhythmia     R BUNDLE BRANCH BLOCK    Arthritis     Diabetes ([facility] [address]     TYPE II, INSULIN DEPENDANT    GERD (gastroesophageal reflux disease)     MARCUS'S ESOPHAGUS    Hypertension     Liver disease     FATTY LIVER    Other ill-defined conditions(799.89)     High Cholesterol    Other ill-defined conditions(799.89)     IRON DEFICIENT ANEMIA    Psychiatric disorder     Depression    Unspecified sleep apnea     CPAP     Family History   Problem Relation Age of Onset    Hypertension Mother    Herminio Gaspar OSTEOARTHRITIS Mother     Alzheimer's Disease Father     Lung Disease Father     Parkinson's Disease Father     No Known Problems Sister     No Known Problems Sister    Herminio Gaspar Anesth Problems Neg Hx      Social History     Socioeconomic History    Marital status:      Spouse name: Not on file    Number of children: Not on file    Years of education: Not on file    Highest education level: Not on file   Occupational History    Not on file   Tobacco Use    Smoking status: Never Smoker    Smokeless tobacco: Never Used   Substance and Sexual Activity    Alcohol use: Yes     Alcohol/week: 2.0 standard drinks     Types: 2 Cans of beer per week    Drug use: No    Sexual activity: Yes     Partners: Female   Other Topics Concern    Not on file   Social History Narrative    Not on file     Social Determinants of Health     Financial Resource Strain:     Difficulty of Paying Living Expenses: Not on file   Food Insecurity:     Worried About Running Out of Food in the Last Year: Not on file    Bautista of Food in the Last Year: Not on file   Transportation Needs:     Lack of Transportation (Medical): Not on file    Lack of Transportation (Non-Medical):  Not on file   Physical Activity:     Days of Exercise per Week: Not on file    Minutes of Exercise per Session: Not on file   Stress:     Feeling of Stress : Not on file   Social Connections:     Frequency of Communication with Friends and Family: Not on file    Frequency of Social Gatherings with Friends and Family: Not on file    Attends [employer] Services: Not on file    Active Member of [address] [place] [address] [employer] or Organizations: Not on file    Attends Club or Organization Meetings: Not on file    Marital Status: Not on file   Intimate Partner Violence:     Fear of Current or Ex-Partner: Not on file    Emotionally Abused: Not on file    Physically Abused: Not on file    Sexually Abused: Not on file   Housing Stability:     Unable to Pay for Housing in the Last Year: Not on file    Number of [place] in the Last Year: Not on file    Unstable Housing in the Last Year: Not on file     Current Facility-Administered Medications   Medication Dose Route Frequency    benzonatate (TESSALON) capsule 100 mg  100 mg Oral TID PRN    benzocaine-menthoL (CEPACOL) lozenge 1 Lozenge  1 Lozenge Mucous Membrane PRN    famotidine (PF) (PEPCID) 20 mg in 0.9% sodium chloride 10 mL injection  20 mg IntraVENous Q12H    hydrALAZINE (APRESOLINE) 20 mg/mL injection 10 mg  10 mg IntraVENous Q6H PRN    acetaminophen (TYLENOL) tablet 650 mg  650 mg Oral Q4H PRN    Or    acetaminophen (TYLENOL) solution 650 mg  650 mg Per NG tube Q4H PRN    Or    acetaminophen (TYLENOL) suppository 650 mg  650 mg Rectal Q4H PRN    sodium chloride (NS) flush 5-40 mL  5-40 mL IntraVENous Q8H    sodium chloride (NS) flush 5-40 mL  5-40 mL IntraVENous PRN    polyethylene glycol (MIRALAX) packet 17 g  17 g Oral DAILY    senna-docusate (PERICOLACE) 8.6-50 mg per tablet 1 Tablet  1 Tablet Oral BID    magnesium hydroxide (MILK OF MAGNESIA) 400 mg/5 mL oral suspension 30 mL  30 mL Oral DAILY PRN    bisacodyL (DULCOLAX) suppository 10 mg  10 mg Rectal DAILY PRN    ondansetron (ZOFRAN ODT) tablet 4 mg  4 mg Oral Q8H PRN    Or    ondansetron (ZOFRAN) injection 4 mg  4 mg IntraVENous Q6H PRN    enoxaparin (LOVENOX) injection 30 mg  30 mg SubCUTAneous Q12H    [Held by provider] lisinopriL (PRINIVIL, ZESTRIL) tablet 40 mg  40 mg Oral DAILY    rosuvastatin (CRESTOR) tablet 40 mg  40 mg Oral QHS    insulin lispro (HUMALOG) injection 10 Units  10 Units SubCUTAneous ACB&D    gabapentin (NEURONTIN) capsule 800 mg  800 mg Oral Q12H    fluticasone propionate (FLONASE) 50 mcg/actuation nasal spray 2 Spray  2 Spray Both Nostrils DAILY    bismuth subsalicylate (PEPTO-BISMOL) oral suspension 30 mL  30 mL Oral Q6H PRN    famotidine (PEPCID) tablet 20 mg  20 mg Oral BID    lactated Ringers infusion  100 mL/hr IntraVENous CONTINUOUS    guaiFENesin-codeine (ROBITUSSIN AC) 100-10 mg/5 mL solution 10 mL  10 mL Oral Q4H PRN    ascorbic acid (vitamin C) (VITAMIN C) tablet 1,000 mg  1,000 mg Oral DAILY    zinc sulfate (ZINCATE) 50 mg zinc (220 mg) capsule 1 Capsule  1 Capsule Oral DAILY    albuterol (PROVENTIL HFA, VENTOLIN HFA, PROAIR HFA) inhaler 2 Puff  2 Puff Inhalation Q4H PRN     Current Outpatient Medications   Medication Sig    albuterol (PROVENTIL HFA, VENTOLIN HFA, PROAIR HFA) 90 mcg/actuation inhaler Take 2 Puffs by inhalation as needed.  gabapentin (NEURONTIN) 400 mg capsule Take 800 mg by mouth two (2) times a day.  multivitamins-minerals-lutein (Multivitamin 50 Plus) tab tablet Take 1 Tablet by mouth daily.  buPROPion XL (WELLBUTRIN XL) 150 mg tablet Take 150 mg by mouth daily.  cholecalciferol (Vitamin D3) 25 mcg (1,000 unit) cap Take 2,000 Units by mouth daily.  montelukast (SINGULAIR) 10 mg tablet Take 10 mg by mouth nightly.  DULoxetine (CYMBALTA) 60 mg capsule Take 60 mg by mouth daily.  melatonin 5 mg cap capsule Take 5 mg by mouth nightly as needed.  omeprazole (PRILOSEC) 40 mg capsule Take 40 mg by mouth Daily (before breakfast).  metFORMIN (GLUCOPHAGE) 500 mg tablet Take 2 Tablets by mouth two (2) times a day.  fluticasone propionate (FLONASE ALLERGY RELIEF) 50 mcg/actuation nasal spray 2 Sprays by Both Nostrils route daily. Indications: inflammation of the nose due to an allergy    insulin lispro (HUMALOG) 100 unit/mL injection 10 Units by SubCUTAneous route two (2) times a day. LUNCH AND DINNER    quinapril (ACCUPRIL) 40 mg tablet Take 40 mg by mouth two (2) times a day.  rosuvastatin (CRESTOR) 40 mg tablet Take 40 mg by mouth nightly. NON FORMULARY     famotidine (PEPCID) 20 mg tablet Take 20 mg by mouth two (2) times a day. Indications: gastroesophageal reflux disease     No Known Allergies    Visit Vitals  BP (!) 163/78   Pulse 84   Temp 98.2 °F (36.8 °C)   Resp 21   Wt 250 lb 7.1 oz (113.6 kg)   SpO2 94%   BMI 33.97 kg/m²     Physical Exam  Vitals and nursing note reviewed. Constitutional:       Appearance: Normal appearance. He is diaphoretic. Cardiovascular:      Rate and Rhythm: Normal rate. Pulmonary:      Effort: Pulmonary effort is normal.   Neurological:      Mental Status: He is alert. Neurologic Exam     Mental Status   Age-appropriate gentleman awake and alert. Slow to respond but some word finding difficulty. He knows where he is and he knows the month and the year. He can spell ocean correctly. He is following commands. Pupils equal symmetric reactive. Face is covered with mask, COVID-positive  Speech is clear  Activates all extremities against gravity easily symmetrically. No drift. No ataxia supine activating limbs. No abnormal movements  Gait deferred            Lab Results   Component Value Date/Time    WBC 3.7 (L) 06/24/2022 04:48 AM    HGB 11.8 (L) 06/24/2022 04:48 AM    HCT 36.5 (L) 06/24/2022 04:48 AM    PLATELET 93 (L) 53/92/8091 04:48 AM    MCV 92.2 06/24/2022 04:48 AM     Lab Results   Component Value Date/Time    Hemoglobin A1c 11.2 (H) 03/01/2016 04:52 AM    Hemoglobin A1c 6.7 (H) 07/31/2013 06:18 AM    Glucose 172 (H) 06/24/2022 04:48 AM    Glucose (POC) 181 (H) 06/24/2022 12:32 PM    Creatinine 0.94 06/24/2022 04:48 AM      No results found for: CHOL, CHOLPOCT, HDL, LDL, LDLC, LDLCPOC, LDLCEXT, TRIGL, TGLPOCT, CHHD, CHHDX     CT Results (maximum last 3): Results from [place] encounter on 06/23/22    CT HEAD WO CONT    Narrative  INDICATION: Difficulty speaking    EXAM: CT HEAD without contrast.    TECHNIQUE: Unenhanced CT Head is performed. CT dose reduction was achieved  through use of a standardized protocol tailored for this examination and  automatic exposure control for dose modulation. FINDINGS: Brain parenchyma shows no CT apparent ischemia. There is no apparent  mass on unenhanced imaging. There is no bleed, shift, obstructive hydrocephalus  or significant extra-axial fluid collection. Bone windows are unremarkable. Impression  No acute intracranial finding.       [place] [facility] [place] [facility]10/17    CT ABD PELV WO CONT    Narrative  INDICATION: pain, hematuria    COMPARISON: January 8, 2016    TECHNIQUE:  Noncontrast thin axial images were obtained through the abdomen and pelvis. Coronal and sagittal reconstructions were generated. CT dose reduction was  achieved through use of a standardized protocol tailored for this examination  and automatic exposure control for dose modulation. Adaptive statistical  iterative reconstruction (ASIR) was utilized. FINDINGS:  LUNG BASES: Nodular airspace disease/mucoid impaction posterior left lower lobe. LIVER: No mass or biliary dilatation. GALLBLADDER: Surgically absent. SPLEEN: No enlargement or lesion. PANCREAS: No mass or ductal dilatation. ADRENALS: No mass. KIDNEYS: No hydronephrosis. Punctate nonobstructing left renal calculus, upper  pole. GI TRACT:  No bowel obstruction or wall thickening  PERITONEUM: No free air or free fluid. APPENDIX: Not well visualized. RETROPERITONEUM: No lymphadenopathy or aortic aneurysm. REPRODUCTIVE ORGANS: Unremarkable. URINARY BLADDER: No mass or calculus. FREE FLUID:  None. BONES: No destructive bone lesion. ADDITIONAL COMMENTS: N/A. Impression  IMPRESSION:  1. Punctate nonobstructing left renal calculus. No hydronephrosis. 2.  Nodular airspace disease and mucoid impaction posterior left lower lobe of  the lung likely infectious/inflammatory. Dedicated short-term follow-up  recommended to document resolution. MRI Results (maximum last 3): Results from [place] encounter on 06/23/22    MRI BRAIN W WO CONT    Narrative  INDICATION:  TIA/CVA    COMPARISON:  CT June 23, 2022    TECHNIQUE:  Multiplanar multisequence acquisition without and with IV contrast  of the brain. FINDINGS:    Ventricles:  Midline, no hydrocephalus. Brain Parenchyma/Brainstem:  Mild scattered chronic T2 hyperintensities in the  supratentorial brain. No acute infarction.   Intracranial Hemorrhage:  None. Basal Cisterns:  Normal.  Flow Voids:  Normal.  Post Contrast:  No abnormal parenchymal or meningeal enhancement. Additional Comments:  N/A. Impression  Mild chronic microvascular ischemic disease with no acute infarction. Results from [place] encounter on 02/10/21    MRI CARDIAC MORPH FUNC W WO CONT    Narrative  CARDIOVASCULAR MRI    INDICATION: Hypertrophic arthropathy. PROCEDURAL DATA:    CPT Codes: 41803    SCANS PERFORMED:  Spin Echo: Axial.  FIESTA/SSFP  LGE    Contrast Agent: ProHance. Contrast Dose: 32ml. CLINICAL DATA:  HR = 86/min, BP = 187/91mmHg,  HT = 5 foot 11inc, WT = 240lb. Impression  1. Hypertrophic cardiomyopathy with severe hypertrophy of the basal anteroseptal  wall which measures 20 mm. There is asymmetrical hypertrophy of the apex with  apical lateral wall measuring 19 mm. Small left ventricular cavity size with  complete cavity obliteration during end-systole. There is resting mild left  ventricular outflow tract obstruction from the systolic anterior motion of the  anterior papillary muscle. No regional wall motion abnormalities. LVEF 60%. 2. Normal right ventricular size and systolic function. RVEF 60%. 3. Systolic anterior motion of the anterior papillary muscle without significant  mitral regurgitation. 4. On EGE and LGE study, there is a medium-size scar involving 50% thickness of  the subendocardium of the anteroapical wall, apex, inferoapical wall, apical  septal wall in a pattern suggestive of scarring from demand ischemia. The  overall scar volume is about 10-15% on quantitative assessment. There is no  features of infiltrative sarcoidosis or cardiac amyloidosis. There is no  features of inflammatory myocarditis. 5. Normal pleura and pericardium. There is no significant effusions.         CARDIAC CHAMBERS:    Left Atrium:  46 mm (<40mm)  Left Ventricular Size: LVEDD:  33 mm (Normal 37-57mm)  Left Ventricular Size: LVESD:  19 mm (Normal <40mm)  LVEF:  60 % (Normal 55-75%)  LV Hypertrophy: Hypertrophic cardiomyopathy with severe hypertrophy of the basal  anteroseptal wall which measures 20 mm. The distal apical lateral wall measures  19 mm. LV posterior wall =  14 mm (Normal <11mm)    Right Atrium:  42 mm (<40mm)  Right Ventricular Size: RVEDD:  44 mm (Normal 26-45mm)  Right Ventricular Size: RVESD:  31 mm (Normal <25mm)  RVEF:  60 % (Normal 50-60%)  RV Hypertrophy:  None    GREAT VESSELS    Ascending Aorta:  36 mm (normal 22-37mm)  Aortic Arch:  26 mm (normal 18-28mm)  Descending Aorta: 23 mm (normal 14-26mm)  MPA:   22 mm (normal 16-33mm)  LPA: 23 mm (normal 13-22mm)  RPA: 22 mm (normal 12-23mm)    Mass/Tumor/Thrombus:  None. Dissection: None. Atherosclerosis:  None. Inferior Vena Cava:  Normal.  Inter Atrial Septum:  Normal.    VALVULAR:    Mitral Valve: There is systolic anterior motion of the anterior papillary  muscle. There is no significant mitral regurgitation. Aortic Valve:  Trileaflet aortic valve. Normal leaflet mobility. No aortic  stenosis. Tricuspid Valve:  Normal tricuspid valve. Trace tricuspid regurgitation. Pulmonary Valve:  Normal pulmonic valve. Pericardium:  Normal. (<3.5mm)  Pericardial Effusion:  None. Pleural Effusion:  None. Results from [place] encounter on 12/08/10    MRI SPINE CERVICAL WITHOUT CONTRAST    Narrative  Final Report      ICD Codes / Adm. Diagnosis: 722.4  723.4 / DEGENERATION OF CERVICAL INTER  BRACHIAL NEURITIS OR RADICUL  [facility]  - [id number] - Dec  8 2010 10:21AM  Accession No:  [id number]  Reason:  radiculopathy      REPORT:  Examination: MRI cervical spine. Comparisons: none    Sequences: Sagittal T1, T2, and STIR. Axial T1, gradient echo. Axial T2. No  contrast..    Findings: Alignment is normal. There are no suspicious marrow lesions or  evidence of acute bony trauma.  The visualized posterior fossa and cord  signal are normal. Paraspinous soft tissues are within normal limits. C2-C3: No stenosis    C3-C4: There are right-sided facet degenerative changes and uncovertebral  degenerative change. This results in mild to moderate right foraminal  narrowing    C4-C5: There is a central disc protrusion superimposed upon a diffuse disc  osteophytic bulge. There are bilateral uncovertebral degenerative changes  right greater than left. Canal stenosis is mild to moderate flattening and  indentation of the right paracentral cord. There is moderate right and mild  left foraminal narrowing    C5-C6: There is a diffuse disc osteophytic bulge. There is thickening of the  ligamentum flavum. There are bilateral uncovertebral degenerative changes. Canal stenosis is moderate to severe with moderate right greater than left  foraminal narrowing    C6-C7: There are uncovertebral degenerative changes on the right with a  slight disc osteophytic bulge. This results in mild canal and moderate right  foraminal narrowing    Examination of the upper thoracic spine demonstrates small protrusions  without significant stenosis      IMPRESSION:  1. Multilevel degenerative change detailed by level above most significant  at C5-C6        Interpreting/Reading Doctor: Sylvester Ewing ([id number])  Transcribed: n/a on 12/08/2010  Approved: Sylvester Ewing ([id number])  12/08/2010        Distribution:  Attending Doctor: Ananda Brambila      VAS/US/Carotid Doppler Results (maximum last 3): No results found for this or any previous visit. PET Results (maximum last 3): No results found for this or any previous visit. Assessment and Plan        42-year-old gentleman who presents with diffuse weakness and mental status changes. When I speak with him he knows where he is and he knows the month and the year but incorrect date. He has some word finding difficulty. MRI does not show any stroke or contrast-enhancement. I think this may be more toxic metabolic in etiology.   He is following commands for me. I do not think he is seizing. I would continue medical management at this point. If any new neurologic changes occur please let us know and/or activate code stroke. .   This clinical note was dictated with an electronic dictation software that can make unintentional errors. If there are any questions, please contact me directly for clarification.       2 [facility],   NEUROLOGIST  Diplomate DAVID  6/24/2022

## 2022-06-24 NOTE — PROGRESS NOTES
[facility] [facility] Adult  Hospitalist Group                                                                                          Hospitalist Progress Note  Katlyn Loving DO  Answering service: [phone] from in house phone        Date of Service:  2022  NAME:  Mohan Hilliard  :  1955  MRN:  [id number]      Admission Summary:   75 y.o. male presents with several hours duration of abrupt onset, gradual worsening, severe, constant, generalized confusion and weakness that is associated with some urinary incontinence, but otherwise no other symptoms, remitted by nothing, exacerbated by nothing.       Pt was initially admitted for cva workup, mri negeative, patient is ultimately diagnosed with metabolic encephalopathy secondary to COVID-19, too weak to ambulate,       Interval history / Subjective:      No acute events overnight,  , has some cough nonproductive, mild diarrhea, abdominal discomfort  Cardiovascular, respiratory, GI review of system negative except mentioned above        Assessment & Plan:   Metabolic encephalopathy  -Secondary to COVID-19, altered mental status resolved, MRI brain negative, CVA work-up are negative,      COVID-19-no evidence of pneumonia, very symptomatic diffuse weakness, continue supportive care, without additional antibiotics, adjunct vitamin therapy, daily physical and occupational therapy, might require acute rehab placement    Hypertension-holding blood pressure medications, resume when indicated  Diabetes-resume home insulin regiment and adjust as needed  Diabetic neuropathy-continue home gabapentin, adjust as needed    Hospital Problems  Date Reviewed: 2022          Codes Class Noted POA    Essential hypertension ICD-10-CM: I10  ICD-9-CM: 401.9  2022 Yes        Hyperlipidemia ICD-10-CM: E78.5  ICD-9-CM: 272.4  2022 Yes        Obesity ICD-10-CM: E66.9  ICD-9-CM: 278.00  2022 Yes        Sepsis ([address] ICD-10-CM: A41.9  ICD-9-CM: 038.9, 995.91  6/23/2022 Unknown        Diabetic neuropathy ([facility] ICD-10-CM: E11.40  ICD-9-CM: 250.60, 357.2  12/11/2020 Yes        Sleep apnea ICD-10-CM: G47.30  ICD-9-CM: 780.57  12/11/2020 Yes        Cardiac murmur ICD-10-CM: R01.1  ICD-9-CM: 785.2  8/15/2017 Yes        Tear of meniscus of right knee ICD-10-CM: D29.615X  ICD-9-CM: 836.2  10/7/2014 Yes        Tear of meniscus of left knee ICD-10-CM: S94.299F  ICD-9-CM: 836.2  6/10/2014 Yes            Code status:     Review of Systems:   A comprehensive review of systems was negative except for that written in the HPI. Vital Signs:    Last 24hrs VS reviewed since prior progress note.  Most recent are:  Visit Vitals  BP (!) 172/91   Pulse 81   Temp 98.2 °F (36.8 °C)   Resp 19   Wt 113.6 kg (250 lb 7.1 oz)   SpO2 100%   BMI 33.97 kg/m²         Intake/Output Summary (Last 24 hours) at 6/24/2022 1523  Last data filed at 6/23/2022 1914  Gross per 24 hour   Intake 1000 ml   Output --   Net 1000 ml        Physical Examination:     I had a face to face encounter with this patient and independently examined them on 6/24/2022 as outlined below:  General: No acute distress, speaking in full sentences, no use of accessory muscles   HEENT: Pupils equal and reactive to light and accommodation, oropharynx is clear   Neck: Supple, no lymphadenopathy, no JVD   Lungs: Clear to auscultation bilaterally   Cardiovascular: Regular rate and rhythm with normal S1 and S2   Abdomen: Soft, nontender, nondistended, normoactive bowel sounds   Extremities: No cyanosis clubbing or edema   Neuro: Nonfocal, A&O x3   Psych: Normal affect     Data Review:   All vitals, laboratory, imaging, result reviewed and discussed with patient and nursing staff      Labs:     Recent Labs     06/24/22  0448 06/23/22  1722   WBC 3.7* 5.1   HGB 11.8* 13.1   HCT 36.5* 39.9   PLT 93* 114*     Recent Labs     06/24/22  0448 06/23/22  1722   * 135*   K 3.7 4.3    101   CO2 29 27   BUN 10 14   CREA 0. 94 1.16   * 197*   CA 9.1 9.6   MG  --  1.9     Recent Labs     06/24/22  0448 06/23/22  1722   * 128*   AP 85 98   TBILI 0.4 0.5   TP 6.7 7.6   ALB 3.3* 3.7   GLOB 3.4 3.9     Recent Labs     06/23/22  1722   INR 1.1   PTP 11.2*      No results for input(s): FE, TIBC, PSAT, FERR in the last 72 hours. No results found for: FOL, RBCF   No results for input(s): PH, PCO2, PO2 in the last 72 hours. No results for input(s): CPK, CKNDX, TROIQ in the last 72 hours.     No lab exists for component: CPKMB  No results found for: CHOL, CHOLX, CHLST, CHOLV, HDL, HDLP, LDL, LDLC, DLDLP, TGLX, TRIGL, TRIGP, CHHD, CHHDX  Lab Results   Component Value Date/Time    Glucose (POC) 181 (H) 06/24/2022 12:32 PM    Glucose (POC) 123 (H) 02/28/2020 02:23 PM    Glucose (POC) 101 (H) 08/30/2018 02:05 AM    Glucose (POC) 126 (H) 08/29/2018 11:33 PM    Glucose (POC) 150 (H) 01/18/2018 12:29 PM     Lab Results   Component Value Date/Time    Color YELLOW/STRAW 06/23/2022 06:23 PM    Appearance CLEAR 06/23/2022 06:23 PM    Specific gravity 1.020 06/23/2022 06:23 PM    pH (UA) 7.0 06/23/2022 06:23 PM    Protein TRACE (A) 06/23/2022 06:23 PM    Glucose >1,000 (A) 06/23/2022 06:23 PM    Ketone 15 (A) 06/23/2022 06:23 PM    Bilirubin Negative 06/23/2022 06:23 PM    Urobilinogen 2.0 (H) 06/23/2022 06:23 PM    Nitrites Negative 06/23/2022 06:23 PM    Leukocyte Esterase Negative 06/23/2022 06:23 PM    Epithelial cells FEW 06/23/2022 06:23 PM    Bacteria Negative 06/23/2022 06:23 PM    WBC 0-4 06/23/2022 06:23 PM    RBC 0-5 06/23/2022 06:23 PM         Medications Reviewed:     Current Facility-Administered Medications   Medication Dose Route Frequency    benzonatate (TESSALON) capsule 100 mg  100 mg Oral TID PRN    benzocaine-menthoL (CEPACOL) lozenge 1 Lozenge  1 Lozenge Mucous Membrane PRN    hydrALAZINE (APRESOLINE) 20 mg/mL injection 10 mg  10 mg IntraVENous Q6H PRN    acetaminophen (TYLENOL) tablet 650 mg  650 mg Oral Q4H PRN Or    acetaminophen (TYLENOL) solution 650 mg  650 mg Per NG tube Q4H PRN    Or    acetaminophen (TYLENOL) suppository 650 mg  650 mg Rectal Q4H PRN    sodium chloride (NS) flush 5-40 mL  5-40 mL IntraVENous Q8H    sodium chloride (NS) flush 5-40 mL  5-40 mL IntraVENous PRN    polyethylene glycol (MIRALAX) packet 17 g  17 g Oral DAILY    senna-docusate (PERICOLACE) 8.6-50 mg per tablet 1 Tablet  1 Tablet Oral BID    magnesium hydroxide (MILK OF MAGNESIA) 400 mg/5 mL oral suspension 30 mL  30 mL Oral DAILY PRN    bisacodyL (DULCOLAX) suppository 10 mg  10 mg Rectal DAILY PRN    ondansetron (ZOFRAN ODT) tablet 4 mg  4 mg Oral Q8H PRN    Or    ondansetron (ZOFRAN) injection 4 mg  4 mg IntraVENous Q6H PRN    enoxaparin (LOVENOX) injection 30 mg  30 mg SubCUTAneous Q12H    [Held by provider] lisinopriL (PRINIVIL, ZESTRIL) tablet 40 mg  40 mg Oral DAILY    rosuvastatin (CRESTOR) tablet 40 mg  40 mg Oral QHS    insulin lispro (HUMALOG) injection 10 Units  10 Units SubCUTAneous ACB&D    gabapentin (NEURONTIN) capsule 800 mg  800 mg Oral Q12H    fluticasone propionate (FLONASE) 50 mcg/actuation nasal spray 2 Spray  2 Spray Both Nostrils DAILY    bismuth subsalicylate (PEPTO-BISMOL) oral suspension 30 mL  30 mL Oral Q6H PRN    famotidine (PEPCID) tablet 20 mg  20 mg Oral BID    lactated Ringers infusion  100 mL/hr IntraVENous CONTINUOUS    guaiFENesin-codeine (ROBITUSSIN AC) 100-10 mg/5 mL solution 10 mL  10 mL Oral Q4H PRN    ascorbic acid (vitamin C) (VITAMIN C) tablet 1,000 mg  1,000 mg Oral DAILY    zinc sulfate (ZINCATE) 50 mg zinc (220 mg) capsule 1 Capsule  1 Capsule Oral DAILY    albuterol (PROVENTIL HFA, VENTOLIN HFA, PROAIR HFA) inhaler 2 Puff  2 Puff Inhalation Q4H PRN     ______________________________________________________________________    ACTUAL LENGTH OF STAY:          1                 Jeff Atkins DO

## 2022-06-24 NOTE — PROGRESS NOTES
Reason for Admission:  Admitted from [facility] with increased confusion. History of HTN and DM. RUR Score:   7%-Low               Plan for utilizing home health:          PCP: First and Last name:  Julian Tran MD   Are you a current patient: Yes/No: YES  Approximate date of last visit: 05/2022  Can you participate in a virtual visit with your PCP: YES                 Current Advanced Directive/Advance Care Plan: Full Code  Healthcare Decision Maker:           Primary Decision Maker: Rebeca Nichols - Spouse - [phone]              Transition of Care Plan:    Cardiac consult    Neuro consult  Found to be Covid positive on admit. Reports being independent prior to admit. Will need clearing from PT/OT. Anticipate that he will discharge home with family and no skilled needs pending medical workup completion. Medicare pt has received, reviewed, and signed  IM letter informing them of their right to appeal the discharge. Signed copy has been placed on pt bedside chart. Care Management Interventions  PCP Verified by CM:  Yes  Last Visit to PCP: 06/15/22  Palliative Care Criteria Met (RRAT>21 & CHF Dx)?: No  Support Systems: Spouse/Significant Other  Confirm Follow Up Transport: Self  The Patient and/or Patient Representative was Provided with a Choice of Provider and Agrees with the Discharge Plan?:  ([facility])  [employer] Information Provided?: No  Discharge Location  Patient Expects to be Discharged to[de-identified] Home with family assistance

## 2022-06-24 NOTE — PROGRESS NOTES
Physical Therapy - defer  Pt admitted overnight for generalized weakness and initially a Code Stroke level 2. PT/OT orders received but pt also remains with active bedrest orders and is awaiting medical work up (H&P not in chart yet). Will defer at this time and follow up as able/appropriate.

## 2022-06-24 NOTE — PROGRESS NOTES
SPEECH PATHOLOGY BEDSIDE SWALLOW EVALUATION/DISCHARGE  Patient: Rena Bruno (74 y.o. male)  Date: 6/24/2022  Primary Diagnosis: Sepsis ([facility] [A41.9]       Precautions:        ASSESSMENT :  Patient in with confusion, COVID +. MRI completed and negative for CVA. Neuro suspecting toxic metabolic encephalopathy. Based on the objective data described below, the patient presents with no oral or pharyngeal dysphagia. Patient with timely and complete mastication of solid. No overt s/s aspiration with successive cup/straw sips of thins. He was placed on a soft and bite sized diet on admission; however, patient does not require softer foods. Skilled acute therapy provided by a speech-language pathologist is not indicated at this time. PLAN :  Recommendations:  Regular diet/ thin liquids. Discharge Recommendations: None     SUBJECTIVE:   Patient alert, sitting up in bed reporting he feels fine.     OBJECTIVE:     Past Medical History:   Diagnosis Date    Arrhythmia     R BUNDLE BRANCH BLOCK    Arthritis     Diabetes ([facility]     TYPE II, INSULIN DEPENDANT    GERD (gastroesophageal reflux disease)     MARCUS'S ESOPHAGUS    Hypertension     Liver disease     FATTY LIVER    Other ill-defined conditions(799.89)     High Cholesterol    Other ill-defined conditions(799.89)     IRON DEFICIENT ANEMIA    Psychiatric disorder     Depression    Unspecified sleep apnea     CPAP     Past Surgical History:   Procedure Laterality Date    COLONOSCOPY N/A 11/24/2021    COLONOSCOPY AND EGD   :- performed by Kathy Turner MD at [address] HX GI  02/2007    COLONOSCOPY    HX GI  2017    ENDOSCOPY, UPPER    HX HEENT  2002    EYELID REDUCTION    HX KNEE ARTHROSCOPY Left 2014    MENISCUS REPAIR    HX KNEE ARTHROSCOPY Right 2014    HX LAP CHOLECYSTECTOMY  2007    HX OTHER SURGICAL Right 2012    LIPOMA EXCISION UPPER ABD     Prior Level of Function/Home Situation:   Home Situation  Patient Expects to be Discharged to[de-identified] Home  Diet prior to admission: regular/thin  Current Diet:  SBS/thins   Cognitive and Communication Status:  Neurologic State: Confused  Orientation Level: Disoriented to situation,Oriented to time,Oriented to place,Oriented to person  Cognition: Follows commands           Oral Assessment:  Oral Assessment  Labial: No impairment  Dentition: Natural  Oral Hygiene: wfl  Lingual: No impairment  Velum: Unable to visualize  Mandible: No impairment  P.O. Trials:  Patient Position: up in bed  Vocal quality prior to P.O.: No impairment  Consistency Presented: Thin liquid; Solid;Puree  How Presented: Successive swallows;Straw;Self-fed/presented     Bolus Acceptance: No impairment  Bolus Formation/Control: No impairment     Propulsion: No impairment  Oral Residue: None  Initiation of Swallow: No impairment  Laryngeal Elevation: Functional  Aspiration Signs/Symptoms: None  Pharyngeal Phase Characteristics: No impairment, issues, or problems   Effective Modifications: None  Cues for Modifications: None       Oral Phase Severity: No impairment  Pharyngeal Phase Severity : No impairment  NOMS:   The NOMS functional outcome measure was used to quantify this patient's level of swallowing impairment. Based on the NOMS, the patient was determined to be at level 7 for swallow function     NOMS Swallowing Levels:  Level 1 (CN): NPO  Level 2 (CM): NPO but takes consistency in therapy  Level 3 (CL): Takes less than 50% of nutrition p.o. and continues with nonoral feedings; and/or safe with mod cues; and/or max diet restriction  Level 4 (CK): Safe swallow but needs mod cues; and/or mod diet restriction; and/or still requires some nonoral feeding/supplements  Level 5 (CJ): Safe swallow with min diet restriction; and/or needs min cues  Level 6 (CI): Independent with p.o.; rare cues; usually self cues; may need to avoid some foods or needs extra time  Level 7 ([address] [place] [address]): Independent for all p.o.  SVEN. (2003).  National Outcomes Measurement System (NOMS): Adult Speech-Language Pathology User's Guide. Pain:           After treatment:   Patient left in no apparent distress in bed, Call bell within reach and Nursing notified    COMMUNICATION/EDUCATION:     The patient's plan of care including recommendations, planned interventions, and recommended diet changes were discussed with: Registered nurse.      Thank you for this referral.  DOMINIC Green  Time Calculation: 10 mins

## 2022-06-24 NOTE — ED NOTES
Bedside shift change report given to bhumika (oncoming nurse) by Farida/juan pablo (offgoing nurse). Report included the following information SBAR, ED Summary and Intake/Output.

## 2022-06-24 NOTE — PROGRESS NOTES
Speech pathology  Orders received, chart reviewed. Note patient passed swallow screen and is on a soft and bite sized diet/ thin liquids. Brain MRI completed and negative for acute infarct. Limited notes in patient chart. Will follow up with RN in the ED later this afternoon to determine if formal evaluation is indicated.   Jason Mcrae M.S. IESHA-SLP

## 2022-06-24 NOTE — ED NOTES
Report given to Jessica Correia on [facility]. All questions answered.

## 2022-06-25 ENCOUNTER — APPOINTMENT (OUTPATIENT)
Dept: GENERAL RADIOLOGY | Age: 67
DRG: 177 | End: 2022-06-25
Attending: HOSPITALIST
Payer: MEDICARE

## 2022-06-25 LAB
ALBUMIN SERPL-MCNC: 2.4 G/DL (ref 3.5–5)
ALBUMIN/GLOB SERPL: 0.9 {RATIO} (ref 1.1–2.2)
ALP SERPL-CCNC: 62 U/L (ref 45–117)
ALT SERPL-CCNC: 69 U/L (ref 12–78)
ANION GAP SERPL CALC-SCNC: 8 MMOL/L (ref 5–15)
AST SERPL-CCNC: 54 U/L (ref 15–37)
BASOPHILS # BLD: 0 K/UL (ref 0–0.1)
BASOPHILS NFR BLD: 1 % (ref 0–1)
BILIRUB SERPL-MCNC: 0.3 MG/DL (ref 0.2–1)
BUN SERPL-MCNC: 8 MG/DL (ref 6–20)
BUN/CREAT SERPL: 12 (ref 12–20)
CALCIUM SERPL-MCNC: 8.5 MG/DL (ref 8.5–10.1)
CHLORIDE SERPL-SCNC: 106 MMOL/L (ref 97–108)
CHOLEST SERPL-MCNC: 57 MG/DL
CO2 SERPL-SCNC: 23 MMOL/L (ref 21–32)
CREAT SERPL-MCNC: 0.65 MG/DL (ref 0.7–1.3)
DIFFERENTIAL METHOD BLD: ABNORMAL
EOSINOPHIL # BLD: 0.1 K/UL (ref 0–0.4)
EOSINOPHIL NFR BLD: 3 % (ref 0–7)
ERYTHROCYTE [DISTWIDTH] IN BLOOD BY AUTOMATED COUNT: 14 % (ref 11.5–14.5)
EST. AVERAGE GLUCOSE BLD GHB EST-MCNC: 189 MG/DL
GLOBULIN SER CALC-MCNC: 2.6 G/DL (ref 2–4)
GLUCOSE BLD STRIP.AUTO-MCNC: 143 MG/DL (ref 65–117)
GLUCOSE BLD STRIP.AUTO-MCNC: 147 MG/DL (ref 65–117)
GLUCOSE BLD STRIP.AUTO-MCNC: 208 MG/DL (ref 65–117)
GLUCOSE BLD STRIP.AUTO-MCNC: 249 MG/DL (ref 65–117)
GLUCOSE SERPL-MCNC: 135 MG/DL (ref 65–100)
HBA1C MFR BLD: 8.2 % (ref 4–5.6)
HCT VFR BLD AUTO: 34 % (ref 36.6–50.3)
HDLC SERPL-MCNC: 25 MG/DL
HDLC SERPL: 2.3 {RATIO} (ref 0–5)
HGB BLD-MCNC: 11 G/DL (ref 12.1–17)
IMM GRANULOCYTES # BLD AUTO: 0 K/UL (ref 0–0.04)
IMM GRANULOCYTES NFR BLD AUTO: 0 % (ref 0–0.5)
LDLC SERPL CALC-MCNC: 17.4 MG/DL (ref 0–100)
LYMPHOCYTES # BLD: 0.9 K/UL (ref 0.8–3.5)
LYMPHOCYTES NFR BLD: 28 % (ref 12–49)
MAGNESIUM SERPL-MCNC: 1.5 MG/DL (ref 1.6–2.4)
MCH RBC QN AUTO: 29.6 PG (ref 26–34)
MCHC RBC AUTO-ENTMCNC: 32.4 G/DL (ref 30–36.5)
MCV RBC AUTO: 91.4 FL (ref 80–99)
MONOCYTES # BLD: 0.4 K/UL (ref 0–1)
MONOCYTES NFR BLD: 13 % (ref 5–13)
NEUTS SEG # BLD: 1.7 K/UL (ref 1.8–8)
NEUTS SEG NFR BLD: 55 % (ref 32–75)
NRBC # BLD: 0 K/UL (ref 0–0.01)
NRBC BLD-RTO: 0 PER 100 WBC
PLATELET # BLD AUTO: 82 K/UL (ref 150–400)
PMV BLD AUTO: 9.9 FL (ref 8.9–12.9)
POTASSIUM SERPL-SCNC: 3.9 MMOL/L (ref 3.5–5.1)
PROT SERPL-MCNC: 5 G/DL (ref 6.4–8.2)
RBC # BLD AUTO: 3.72 M/UL (ref 4.1–5.7)
RBC MORPH BLD: ABNORMAL
SERVICE CMNT-IMP: ABNORMAL
SODIUM SERPL-SCNC: 137 MMOL/L (ref 136–145)
TRIGL SERPL-MCNC: 73 MG/DL (ref ?–150)
VLDLC SERPL CALC-MCNC: 14.6 MG/DL
WBC # BLD AUTO: 3.1 K/UL (ref 4.1–11.1)

## 2022-06-25 PROCEDURE — 97161 PT EVAL LOW COMPLEX 20 MIN: CPT

## 2022-06-25 PROCEDURE — 97530 THERAPEUTIC ACTIVITIES: CPT

## 2022-06-25 PROCEDURE — 74011250637 HC RX REV CODE- 250/637: Performed by: STUDENT IN AN ORGANIZED HEALTH CARE EDUCATION/TRAINING PROGRAM

## 2022-06-25 PROCEDURE — 36415 COLL VENOUS BLD VENIPUNCTURE: CPT

## 2022-06-25 PROCEDURE — 86704 HEP B CORE ANTIBODY TOTAL: CPT

## 2022-06-25 PROCEDURE — 65270000029 HC RM PRIVATE

## 2022-06-25 PROCEDURE — 74011250636 HC RX REV CODE- 250/636: Performed by: HOSPITALIST

## 2022-06-25 PROCEDURE — 86803 HEPATITIS C AB TEST: CPT

## 2022-06-25 PROCEDURE — 74011636637 HC RX REV CODE- 636/637: Performed by: STUDENT IN AN ORGANIZED HEALTH CARE EDUCATION/TRAINING PROGRAM

## 2022-06-25 PROCEDURE — 83036 HEMOGLOBIN GLYCOSYLATED A1C: CPT

## 2022-06-25 PROCEDURE — 80053 COMPREHEN METABOLIC PANEL: CPT

## 2022-06-25 PROCEDURE — 80061 LIPID PANEL: CPT

## 2022-06-25 PROCEDURE — 74011250637 HC RX REV CODE- 250/637: Performed by: HOSPITALIST

## 2022-06-25 PROCEDURE — 82962 GLUCOSE BLOOD TEST: CPT

## 2022-06-25 PROCEDURE — 71045 X-RAY EXAM CHEST 1 VIEW: CPT

## 2022-06-25 PROCEDURE — 74011000250 HC RX REV CODE- 250: Performed by: HOSPITALIST

## 2022-06-25 PROCEDURE — 83735 ASSAY OF MAGNESIUM: CPT

## 2022-06-25 PROCEDURE — 97116 GAIT TRAINING THERAPY: CPT

## 2022-06-25 PROCEDURE — 85025 COMPLETE CBC W/AUTO DIFF WBC: CPT

## 2022-06-25 RX ORDER — LISINOPRIL 20 MG/1
40 TABLET ORAL DAILY
Status: DISCONTINUED | OUTPATIENT
Start: 2022-06-25 | End: 2022-06-26 | Stop reason: HOSPADM

## 2022-06-25 RX ORDER — MAGNESIUM SULFATE HEPTAHYDRATE 40 MG/ML
4 INJECTION, SOLUTION INTRAVENOUS ONCE
Status: COMPLETED | OUTPATIENT
Start: 2022-06-25 | End: 2022-06-25

## 2022-06-25 RX ORDER — LANOLIN ALCOHOL/MO/W.PET/CERES
3 CREAM (GRAM) TOPICAL
Status: DISCONTINUED | OUTPATIENT
Start: 2022-06-25 | End: 2022-06-26 | Stop reason: HOSPADM

## 2022-06-25 RX ADMIN — SODIUM CHLORIDE, PRESERVATIVE FREE 10 ML: 5 INJECTION INTRAVENOUS at 21:37

## 2022-06-25 RX ADMIN — ENOXAPARIN SODIUM 30 MG: 100 INJECTION SUBCUTANEOUS at 12:36

## 2022-06-25 RX ADMIN — LISINOPRIL 40 MG: 20 TABLET ORAL at 18:05

## 2022-06-25 RX ADMIN — ENOXAPARIN SODIUM 30 MG: 100 INJECTION SUBCUTANEOUS at 21:36

## 2022-06-25 RX ADMIN — Medication 10 UNITS: at 18:04

## 2022-06-25 RX ADMIN — OXYCODONE HYDROCHLORIDE AND ACETAMINOPHEN 1000 MG: 500 TABLET ORAL at 12:35

## 2022-06-25 RX ADMIN — Medication 3 MG: at 21:36

## 2022-06-25 RX ADMIN — SODIUM CHLORIDE, PRESERVATIVE FREE 10 ML: 5 INJECTION INTRAVENOUS at 15:15

## 2022-06-25 RX ADMIN — SODIUM CHLORIDE, PRESERVATIVE FREE 10 ML: 5 INJECTION INTRAVENOUS at 05:49

## 2022-06-25 RX ADMIN — FLUTICASONE PROPIONATE 2 SPRAY: 50 SPRAY, METERED NASAL at 12:40

## 2022-06-25 RX ADMIN — Medication 10 UNITS: at 18:05

## 2022-06-25 RX ADMIN — SODIUM CHLORIDE, PRESERVATIVE FREE 2 G: 5 INJECTION INTRAVENOUS at 12:38

## 2022-06-25 RX ADMIN — BENZONATATE 100 MG: 100 CAPSULE ORAL at 05:49

## 2022-06-25 RX ADMIN — FAMOTIDINE 20 MG: 20 TABLET, FILM COATED ORAL at 12:35

## 2022-06-25 RX ADMIN — MAGNESIUM SULFATE HEPTAHYDRATE 4 G: 4 INJECTION, SOLUTION INTRAVENOUS at 12:18

## 2022-06-25 RX ADMIN — ROSUVASTATIN CALCIUM 40 MG: 10 TABLET, FILM COATED ORAL at 21:36

## 2022-06-25 RX ADMIN — SENNOSIDES AND DOCUSATE SODIUM 1 TABLET: 50; 8.6 TABLET ORAL at 21:37

## 2022-06-25 RX ADMIN — ACETAMINOPHEN 650 MG: 325 TABLET ORAL at 21:51

## 2022-06-25 RX ADMIN — GABAPENTIN 800 MG: 400 CAPSULE ORAL at 21:36

## 2022-06-25 RX ADMIN — AZITHROMYCIN MONOHYDRATE 500 MG: 500 INJECTION, POWDER, LYOPHILIZED, FOR SOLUTION INTRAVENOUS at 12:26

## 2022-06-25 RX ADMIN — SODIUM CHLORIDE, POTASSIUM CHLORIDE, SODIUM LACTATE AND CALCIUM CHLORIDE 100 ML/HR: 600; 310; 30; 20 INJECTION, SOLUTION INTRAVENOUS at 00:36

## 2022-06-25 RX ADMIN — Medication 1 CAPSULE: at 12:36

## 2022-06-25 RX ADMIN — GABAPENTIN 800 MG: 400 CAPSULE ORAL at 12:35

## 2022-06-25 RX ADMIN — FAMOTIDINE 20 MG: 20 TABLET, FILM COATED ORAL at 18:07

## 2022-06-25 NOTE — PROGRESS NOTES
[facility] [facility] Adult  Hospitalist Group                                                                                          Hospitalist Progress Note  Argentina Madera DO  Answering service: [phone] from in house phone        Date of Service:  2022  NAME:  Lela Mccullough  :  1955  MRN:  [id number]      Admission Summary:   75 y.o. male presents with several hours duration of abrupt onset, gradual worsening, severe, constant, generalized confusion and weakness that is associated with some urinary incontinence, but otherwise no other symptoms, remitted by nothing, exacerbated by nothing.       Pt was initially admitted for cva workup, mri negeative, patient is ultimately diagnosed with metabolic encephalopathy secondary to COVID-19, too weak to ambulate,       Interval history / Subjective:      No acute events overnight,  ,   Clinically improving, strength improving, still unable to ambulate safely by self, low-grade fever overnight resolved with Tylenol  Cardiovascular, respiratory, GI review of system negative except mentioned above          Assessment & Plan:   Dehydration-status post IV fluids    Hypomagnesia anemia-status post replacement    Hypokalemia-status post replacement    Metabolic encephalopathy  -Secondary to COVID-19, altered mental status resolved, MRI brain negative, CVA work-up are negative,      COVID-19-no evidence of pneumonia, very symptomatic diffuse weakness, continue supportive care, without additional antibiotics, adjunct vitamin therapy, daily physical and occupational therapy, might require acute rehab placement  -due to fever, will start empiric Rocephin and azithromycin's, repeat procalcitonin,  de-escalation when clinically improving      Hypertension-holding blood pressure medications, resume when indicated  -we will resume home blood pressure medications      Diabetes-resume home insulin regiment and adjust as needed  Diabetic neuropathy-continue home gabapentin, adjust as needed    Hospital Problems  Date Reviewed: 6/24/2022          Codes Class Noted POA    Essential hypertension ICD-10-CM: I10  ICD-9-CM: 401.9  6/24/2022 Yes        Hyperlipidemia ICD-10-CM: E78.5  ICD-9-CM: 272.4  6/24/2022 Yes        Obesity ICD-10-CM: E66.9  ICD-9-CM: 278.00  6/24/2022 Yes        Sepsis ([facility] ICD-10-CM: A41.9  ICD-9-CM: 038.9, 995.91  6/23/2022 Unknown        Diabetic neuropathy ([facility] ICD-10-CM: E11.40  ICD-9-CM: 250.60, 357.2  12/11/2020 Yes        Sleep apnea ICD-10-CM: G47.30  ICD-9-CM: 780.57  12/11/2020 Yes        Cardiac murmur ICD-10-CM: R01.1  ICD-9-CM: 785.2  8/15/2017 Yes        Tear of meniscus of right knee ICD-10-CM: R20.272Q  ICD-9-CM: 836.2  10/7/2014 Yes        Tear of meniscus of left knee ICD-10-CM: C64.837S  ICD-9-CM: 836.2  6/10/2014 Yes            Code status:     Review of Systems:   A comprehensive review of systems was negative except for that written in the HPI. Vital Signs:    Last 24hrs VS reviewed since prior progress note.  Most recent are:  Visit Vitals  BP (!) 169/73 (BP 1 Location: Left upper arm, BP Patient Position: At rest)   Pulse 84   Temp 98.9 °F (37.2 °C)   Resp 21   Ht 6' (1.829 m)   Wt 113 kg (249 lb 1.9 oz)   SpO2 95%   BMI 33.79 kg/m²       No intake or output data in the 24 hours ending 06/25/22 8849     Physical Examination:     I had a face to face encounter with this patient and independently examined them on 6/25/2022 as outlined below:  General: No acute distress, speaking in full sentences, no use of accessory muscles   HEENT: Pupils equal and reactive to light and accommodation, oropharynx is clear   Neck: Supple, no lymphadenopathy, no JVD   Lungs: Clear to auscultation bilaterally   Cardiovascular: Regular rate and rhythm with normal S1 and S2   Abdomen: Soft, nontender, nondistended, normoactive bowel sounds   Extremities: No cyanosis clubbing or edema   Neuro: Nonfocal, A&O x3   Psych: Normal affect     Data Review:   All vitals, laboratory, imaging, result reviewed and discussed with patient and nursing staff      Labs:     Recent Labs     06/25/22  0555 06/24/22 0448   WBC 3.1* 3.7*   HGB 11.0* 11.8*   HCT 34.0* 36.5*   PLT 82* 93*     Recent Labs     06/25/22  0555 06/24/22 0448 06/23/22  1722    135* 135*   K 3.9 3.7 4.3    102 101   CO2 23 29 27   BUN 8 10 14   CREA 0.65* 0.94 1.16   * 172* 197*   CA 8.5 9.1 9.6   MG 1.5*  --  1.9     Recent Labs     06/25/22 0555 06/24/22 0448 06/23/22  1722   ALT 69 109* 128*   AP 62 85 98   TBILI 0.3 0.4 0.5   TP 5.0* 6.7 7.6   ALB 2.4* 3.3* 3.7   GLOB 2.6 3.4 3.9     Recent Labs     06/23/22  1722   INR 1.1   PTP 11.2*      No results for input(s): FE, TIBC, PSAT, FERR in the last 72 hours. No results found for: FOL, RBCF   No results for input(s): PH, PCO2, PO2 in the last 72 hours. No results for input(s): CPK, CKNDX, TROIQ in the last 72 hours.     No lab exists for component: CPKMB  Lab Results   Component Value Date/Time    Cholesterol, total 57 06/25/2022 05:55 AM    HDL Cholesterol 25 06/25/2022 05:55 AM    LDL, calculated 17.4 06/25/2022 05:55 AM    Triglyceride 73 06/25/2022 05:55 AM    CHOL/HDL Ratio 2.3 06/25/2022 05:55 AM     Lab Results   Component Value Date/Time    Glucose (POC) 208 (H) 06/25/2022 12:50 PM    Glucose (POC) 249 (H) 06/25/2022 10:12 AM    Glucose (POC) 119 (H) 06/24/2022 09:32 PM    Glucose (POC) 218 (H) 06/24/2022 05:27 PM    Glucose (POC) 181 (H) 06/24/2022 12:32 PM     Lab Results   Component Value Date/Time    Color YELLOW/STRAW 06/23/2022 06:23 PM    Appearance CLEAR 06/23/2022 06:23 PM    Specific gravity 1.020 06/23/2022 06:23 PM    pH (UA) 7.0 06/23/2022 06:23 PM    Protein TRACE (A) 06/23/2022 06:23 PM    Glucose >1,000 (A) 06/23/2022 06:23 PM    Ketone 15 (A) 06/23/2022 06:23 PM    Bilirubin Negative 06/23/2022 06:23 PM    Urobilinogen 2.0 (H) 06/23/2022 06:23 PM    Nitrites Negative 06/23/2022 06:23 PM    Leukocyte Esterase Negative 06/23/2022 06:23 PM    Epithelial cells FEW 06/23/2022 06:23 PM    Bacteria Negative 06/23/2022 06:23 PM    WBC 0-4 06/23/2022 06:23 PM    RBC 0-5 06/23/2022 06:23 PM         Medications Reviewed:     Current Facility-Administered Medications   Medication Dose Route Frequency    cefTRIAXone (ROCEPHIN) 2 g in 0.9% sodium chloride 20 mL IV syringe  2 g IntraVENous Q24H    azithromycin (ZITHROMAX) 500 mg in 0.9% sodium chloride 250 mL ([id number])  500 mg IntraVENous Q24H    melatonin tablet 3 mg  3 mg Oral QHS    benzonatate (TESSALON) capsule 100 mg  100 mg Oral TID PRN    benzocaine-menthoL (CEPACOL) lozenge 1 Lozenge  1 Lozenge Mucous Membrane PRN    hydrALAZINE (APRESOLINE) 20 mg/mL injection 10 mg  10 mg IntraVENous Q6H PRN    acetaminophen (TYLENOL) tablet 650 mg  650 mg Oral Q4H PRN    Or    acetaminophen (TYLENOL) solution 650 mg  650 mg Per NG tube Q4H PRN    Or    acetaminophen (TYLENOL) suppository 650 mg  650 mg Rectal Q4H PRN    sodium chloride (NS) flush 5-40 mL  5-40 mL IntraVENous Q8H    sodium chloride (NS) flush 5-40 mL  5-40 mL IntraVENous PRN    polyethylene glycol (MIRALAX) packet 17 g  17 g Oral DAILY    senna-docusate (PERICOLACE) 8.6-50 mg per tablet 1 Tablet  1 Tablet Oral BID    magnesium hydroxide (MILK OF MAGNESIA) 400 mg/5 mL oral suspension 30 mL  30 mL Oral DAILY PRN    bisacodyL (DULCOLAX) suppository 10 mg  10 mg Rectal DAILY PRN    ondansetron (ZOFRAN ODT) tablet 4 mg  4 mg Oral Q8H PRN    Or    ondansetron (ZOFRAN) injection 4 mg  4 mg IntraVENous Q6H PRN    enoxaparin (LOVENOX) injection 30 mg  30 mg SubCUTAneous Q12H    [Held by provider] lisinopriL (PRINIVIL, ZESTRIL) tablet 40 mg  40 mg Oral DAILY    rosuvastatin (CRESTOR) tablet 40 mg  40 mg Oral QHS    insulin lispro (HUMALOG) injection 10 Units  10 Units SubCUTAneous ACB&D    gabapentin (NEURONTIN) capsule 800 mg  800 mg Oral Q12H    fluticasone propionate (FLONASE) 50 mcg/actuation nasal spray 2 Spray  2 Spray Both Nostrils DAILY    bismuth subsalicylate (PEPTO-BISMOL) oral suspension 30 mL  30 mL Oral Q6H PRN    famotidine (PEPCID) tablet 20 mg  20 mg Oral BID    lactated Ringers infusion  100 mL/hr IntraVENous CONTINUOUS    guaiFENesin-codeine (ROBITUSSIN AC) 100-10 mg/5 mL solution 10 mL  10 mL Oral Q4H PRN    ascorbic acid (vitamin C) (VITAMIN C) tablet 1,000 mg  1,000 mg Oral DAILY    zinc sulfate (ZINCATE) 50 mg zinc (220 mg) capsule 1 Capsule  1 Capsule Oral DAILY    albuterol (PROVENTIL HFA, VENTOLIN HFA, PROAIR HFA) inhaler 2 Puff  2 Puff Inhalation Q4H PRN     ______________________________________________________________________    ACTUAL LENGTH OF STAY:          2                 Zuleyma Romeo DO

## 2022-06-25 NOTE — PROGRESS NOTES
Problem: Mobility Impaired (Adult and Pediatric)  Goal: *Acute Goals and Plan of Care (Insert Text)  Description: FUNCTIONAL STATUS PRIOR TO ADMISSION: Patient was independent and active without use of DME. Using cane PRN for community mobility    HOME SUPPORT PRIOR TO ADMISSION: The patient lived with spouse but did not require assist.    Physical Therapy Goals  Initiated 6/25/2022  1. Patient will move from supine to sit and sit to supine  in bed with independence within 7 day(s). 2.  Patient will transfer from bed to chair and chair to bed with modified independence using the least restrictive device within 7 day(s). 3.  Patient will perform sit to stand with modified independence within 7 day(s). 4.  Patient will ambulate with modified independence for 150 feet with the least restrictive device within 7 day(s). 5.  Patient will ascend/descend 12 stairs with handrail(s) with supervision/set-up within 7 day(s). Outcome: Progressing Towards Goal     PHYSICAL THERAPY EVALUATION  Patient: Abby Waldrop (43 y.o. male)  Date: 6/25/2022  Primary Diagnosis: Sepsis ([facility] [address] [A41.9]        Precautions: COVID 19 +       ASSESSMENT  Patient admitted with confusion and COVID 19. Based on the objective data described below, the patient presents with generalized weakness, impaired balance, and impaired activity tolerance. Ambulated in room with rolling walker and stand-by-assist on room air with cues for assistive device management. Recommend utilization of rolling walker for gait at this time here and upon discharge (has walker at home) and HHPT to maximize functional potential and decrease risk of falls. Had recently traveled and was having difficulty getting up the stairs and standing up, likely in setting of his COVID 19 diagnosis (he reports his whole family has COVID but his wife is at home). Acute PT will continue to follow and progress as able.      Current Level of Function Impacting Discharge (mobility/balance): stand-by-assist with rolling walker for gait    Other factors to consider for discharge: COVID 19     Patient will benefit from skilled therapy intervention to address the above noted impairments. PLAN :  Recommendations and Planned Interventions: bed mobility training, transfer training, gait training, therapeutic exercises, neuromuscular re-education, patient and family training/education and therapeutic activities      Frequency/Duration: Patient will be followed by physical therapy:  3 times a week to address goals.     Recommendation for discharge: (in order for the patient to meet his/her long term goals)  Physical therapy at least 2 days/week in the home AND ensure assist and/or supervision for safety with functional mobility/gait    This discharge recommendation:  Has not yet been discussed the attending provider and/or case management    IF patient discharges home will need the following DME: patient owns DME required for discharge       SUBJECTIVE:   Patient stated I was eyeing that walker\"    OBJECTIVE DATA SUMMARY:   HISTORY:    Past Medical History:   Diagnosis Date    Arrhythmia     R BUNDLE BRANCH BLOCK    Arthritis     Diabetes ([facility] [address]     TYPE II, INSULIN DEPENDANT    GERD (gastroesophageal reflux disease)     MARCUS'S ESOPHAGUS    Hypertension     Liver disease     FATTY LIVER    Other ill-defined conditions(799.89)     High Cholesterol    Other ill-defined conditions(799.89)     IRON DEFICIENT ANEMIA    Psychiatric disorder     Depression    Unspecified sleep apnea     CPAP     Past Surgical History:   Procedure Laterality Date    COLONOSCOPY N/A 11/24/2021    COLONOSCOPY AND EGD   :- performed by Cel[facility] at [facility] ENDOSCOPY    HX GI  02/2007    COLONOSCOPY    HX GI  2017    ENDOSCOPY, UPPER    HX HEENT  2002    EYELID REDUCTION    HX KNEE ARTHROSCOPY Left 2014    MENISCUS REPAIR    HX KNEE ARTHROSCOPY Right 2014    HX LAP CHOLECYSTECTOMY  2007  HX OTHER SURGICAL Right 2012    LIPOMA EXCISION UPPER ABD       Personal factors and/or comorbidities impacting plan of care: DM 2, neuropathy    Home Situation  Home Environment: Private residence  # Steps to Enter: 8  Rails to Enter: Yes  One/Two Story Residence: Two story  Support Systems: Spouse/Significant Other  Patient Expects to be Discharged to[de-identified] Home with family assistance  Current DME Used/Available at Home: Cane, straight,Walker, rolling    EXAMINATION/PRESENTATION/DECISION MAKING:   Critical Behavior:  Neurologic State: Confused  Orientation Level: Disoriented to situation,Disoriented to place,Oriented to person,Oriented to time  Cognition: Follows commands     Hearing:   [place], using devices    Range Of Motion:  AROM: Generally decreased, functional  PROM: Generally decreased, functional  Strength:    Strength: Generally decreased, functional  Tone & Sensation:   Tone: Normal  Sensation: Impaired (neuropathy B feet)    Coordination:  Coordination: Generally decreased, functional  Vision:     WFL  Functional Mobility:  Bed Mobility:  Supine to Sit: Modified independent  Sit to Supine: Modified independent  Transfers:  Sit to Stand: Stand-by assistance; Adaptive equipment  Stand to Sit: Stand-by assistance; Adaptive equipment  Balance:   Sitting: Intact  Standing: Impaired; With support  Standing - Static: Constant support;Good  Standing - Dynamic : Constant support;Good  Ambulation/Gait Training:  Distance (ft): 40 Feet (ft)  Assistive Device: Walker, rolling  Ambulation - Level of Assistance: Stand-by assistance  Gait Description (WDL): Exceptions to WDL  Gait Abnormalities: Decreased step clearance  Speed/[place]: Pace decreased (<100 feet/min)  Step Length: Left shortened;Right shortened     Physical Therapy Evaluation Charge Determination   History Examination Presentation Decision-Making   MEDIUM  Complexity : 1-2 comorbidities / personal factors will impact the outcome/ POC  LOW Complexity : 1-2 Standardized tests and measures addressing body structure, function, activity limitation and / or participation in recreation  LOW Complexity : Stable, uncomplicated  LOW Complexity : FOTO score of       Based on the above components, the patient evaluation is determined to be of the following complexity level: LOW     Pain Rating:  No complaints    Activity Tolerance:   Good and Fair    After treatment patient left in no apparent distress:   Supine in bed and Call bell within reach    COMMUNICATION/EDUCATION:   The patients plan of care was discussed with: Registered nurse. Fall prevention education was provided and the patient/caregiver indicated understanding., Patient/family have participated as able in goal setting and plan of care. and Patient/family agree to work toward stated goals and plan of care.     Thank you for this referral.  Kaylie Del Cid, PT   Time Calculation: 30 mins

## 2022-06-26 VITALS
BODY MASS INDEX: 33.74 KG/M2 | DIASTOLIC BLOOD PRESSURE: 69 MMHG | RESPIRATION RATE: 17 BRPM | SYSTOLIC BLOOD PRESSURE: 138 MMHG | TEMPERATURE: 98.5 F | HEIGHT: 72 IN | WEIGHT: 249.12 LBS | HEART RATE: 78 BPM | OXYGEN SATURATION: 95 %

## 2022-06-26 LAB
ALBUMIN SERPL-MCNC: 3.2 G/DL (ref 3.5–5)
ALBUMIN/GLOB SERPL: 0.9 {RATIO} (ref 1.1–2.2)
ALP SERPL-CCNC: 87 U/L (ref 45–117)
ALT SERPL-CCNC: 77 U/L (ref 12–78)
ANION GAP SERPL CALC-SCNC: 6 MMOL/L (ref 5–15)
AST SERPL-CCNC: 56 U/L (ref 15–37)
BASOPHILS # BLD: 0 K/UL (ref 0–0.1)
BASOPHILS NFR BLD: 1 % (ref 0–1)
BILIRUB SERPL-MCNC: 0.4 MG/DL (ref 0.2–1)
BUN SERPL-MCNC: 9 MG/DL (ref 6–20)
BUN/CREAT SERPL: 10 (ref 12–20)
CALCIUM SERPL-MCNC: 9.3 MG/DL (ref 8.5–10.1)
CHLORIDE SERPL-SCNC: 106 MMOL/L (ref 97–108)
CO2 SERPL-SCNC: 27 MMOL/L (ref 21–32)
CREAT SERPL-MCNC: 0.89 MG/DL (ref 0.7–1.3)
DIFFERENTIAL METHOD BLD: ABNORMAL
EOSINOPHIL # BLD: 0.2 K/UL (ref 0–0.4)
EOSINOPHIL NFR BLD: 5 % (ref 0–7)
ERYTHROCYTE [DISTWIDTH] IN BLOOD BY AUTOMATED COUNT: 13.9 % (ref 11.5–14.5)
GLOBULIN SER CALC-MCNC: 3.6 G/DL (ref 2–4)
GLUCOSE BLD STRIP.AUTO-MCNC: 247 MG/DL (ref 65–117)
GLUCOSE SERPL-MCNC: 197 MG/DL (ref 65–100)
HBV CORE AB SERPL QL IA: NEGATIVE
HBV SURFACE AB SER QL: NON REACTIVE INDEX VALUE
HBV SURFACE AG SERPL QL IA: NEGATIVE
HCT VFR BLD AUTO: 38.8 % (ref 36.6–50.3)
HCV AB S/CO SERPL IA: <0.1 S/CO RATIO (ref 0–0.9)
HCV AB SERPL QL IA: NORMAL
HGB BLD-MCNC: 12.6 G/DL (ref 12.1–17)
IMM GRANULOCYTES # BLD AUTO: 0 K/UL (ref 0–0.04)
IMM GRANULOCYTES NFR BLD AUTO: 1 % (ref 0–0.5)
INTERPRETATION, 006720: NORMAL
LYMPHOCYTES # BLD: 1.1 K/UL (ref 0.8–3.5)
LYMPHOCYTES NFR BLD: 25 % (ref 12–49)
MCH RBC QN AUTO: 29.3 PG (ref 26–34)
MCHC RBC AUTO-ENTMCNC: 32.5 G/DL (ref 30–36.5)
MCV RBC AUTO: 90.2 FL (ref 80–99)
MONOCYTES # BLD: 0.5 K/UL (ref 0–1)
MONOCYTES NFR BLD: 10 % (ref 5–13)
NEUTS SEG # BLD: 2.5 K/UL (ref 1.8–8)
NEUTS SEG NFR BLD: 58 % (ref 32–75)
NRBC # BLD: 0 K/UL (ref 0–0.01)
NRBC BLD-RTO: 0 PER 100 WBC
PLATELET # BLD AUTO: 100 K/UL (ref 150–400)
PMV BLD AUTO: 10.4 FL (ref 8.9–12.9)
POTASSIUM SERPL-SCNC: 3.9 MMOL/L (ref 3.5–5.1)
PROCALCITONIN SERPL-MCNC: 0.07 NG/ML
PROT SERPL-MCNC: 6.8 G/DL (ref 6.4–8.2)
RBC # BLD AUTO: 4.3 M/UL (ref 4.1–5.7)
RFX TO HBC IGM: NORMAL
SERVICE CMNT-IMP: ABNORMAL
SODIUM SERPL-SCNC: 139 MMOL/L (ref 136–145)
WBC # BLD AUTO: 4.4 K/UL (ref 4.1–11.1)

## 2022-06-26 PROCEDURE — 80053 COMPREHEN METABOLIC PANEL: CPT

## 2022-06-26 PROCEDURE — 82962 GLUCOSE BLOOD TEST: CPT

## 2022-06-26 PROCEDURE — 74011000250 HC RX REV CODE- 250: Performed by: HOSPITALIST

## 2022-06-26 PROCEDURE — 85025 COMPLETE CBC W/AUTO DIFF WBC: CPT

## 2022-06-26 PROCEDURE — 74011250637 HC RX REV CODE- 250/637: Performed by: STUDENT IN AN ORGANIZED HEALTH CARE EDUCATION/TRAINING PROGRAM

## 2022-06-26 PROCEDURE — 74011250637 HC RX REV CODE- 250/637: Performed by: HOSPITALIST

## 2022-06-26 PROCEDURE — 74011250636 HC RX REV CODE- 250/636: Performed by: HOSPITALIST

## 2022-06-26 PROCEDURE — 74011636637 HC RX REV CODE- 636/637: Performed by: STUDENT IN AN ORGANIZED HEALTH CARE EDUCATION/TRAINING PROGRAM

## 2022-06-26 PROCEDURE — 84145 PROCALCITONIN (PCT): CPT

## 2022-06-26 PROCEDURE — 36415 COLL VENOUS BLD VENIPUNCTURE: CPT

## 2022-06-26 RX ADMIN — SODIUM CHLORIDE, PRESERVATIVE FREE 2 G: 5 INJECTION INTRAVENOUS at 08:42

## 2022-06-26 RX ADMIN — SODIUM CHLORIDE, POTASSIUM CHLORIDE, SODIUM LACTATE AND CALCIUM CHLORIDE 100 ML/HR: 600; 310; 30; 20 INJECTION, SOLUTION INTRAVENOUS at 04:09

## 2022-06-26 RX ADMIN — AZITHROMYCIN MONOHYDRATE 500 MG: 500 INJECTION, POWDER, LYOPHILIZED, FOR SOLUTION INTRAVENOUS at 08:41

## 2022-06-26 RX ADMIN — Medication 1 CAPSULE: at 08:42

## 2022-06-26 RX ADMIN — GABAPENTIN 800 MG: 400 CAPSULE ORAL at 08:41

## 2022-06-26 RX ADMIN — LISINOPRIL 40 MG: 20 TABLET ORAL at 08:42

## 2022-06-26 RX ADMIN — FLUTICASONE PROPIONATE 2 SPRAY: 50 SPRAY, METERED NASAL at 08:44

## 2022-06-26 RX ADMIN — OXYCODONE HYDROCHLORIDE AND ACETAMINOPHEN 1000 MG: 500 TABLET ORAL at 08:42

## 2022-06-26 RX ADMIN — SODIUM CHLORIDE, PRESERVATIVE FREE 10 ML: 5 INJECTION INTRAVENOUS at 07:38

## 2022-06-26 RX ADMIN — FAMOTIDINE 20 MG: 20 TABLET, FILM COATED ORAL at 08:42

## 2022-06-26 RX ADMIN — Medication 10 UNITS: at 12:00

## 2022-06-26 NOTE — PROGRESS NOTES
Problem: Body Temperature -  Risk of, Imbalanced  Goal: Ability to maintain a body temperature within defined limits  Outcome: Progressing Towards Goal  Goal: Will regain or maintain usual level of consciousness  Outcome: Progressing Towards Goal  Goal: Complications related to the disease process, condition or treatment will be avoided or minimized  Outcome: Progressing Towards Goal     Problem: Isolation Precautions - Risk of Spread of Infection  Goal: Prevent transmission of infectious organism to others  Outcome: Progressing Towards Goal     Problem: Nutrition Deficits  Goal: Optimize nutrtional status  Outcome: Progressing Towards Goal     Problem: Risk for Fluid Volume Deficit  Goal: Maintain normal heart rhythm  Outcome: Progressing Towards Goal  Goal: Maintain absence of muscle cramping  Outcome: Progressing Towards Goal  Goal: Maintain normal serum potassium, sodium, calcium, phosphorus, and pH  Outcome: Progressing Towards Goal     Problem: Loneliness or Risk for Loneliness  Goal: Demonstrate positive use of time alone when socialization is not possible  Outcome: Progressing Towards Goal     Problem: Fatigue  Goal: Verbalize increase energy and improved vitality  Outcome: Progressing Towards Goal     Problem: Patient Education: Go to Patient Education Activity  Goal: Patient/Family Education  Outcome: Progressing Towards Goal     Problem: Risk for Spread of Infection  Goal: Prevent transmission of infectious organism to others  Description: Prevent the transmission of infectious organisms to other patients, staff members, and visitors. Outcome: Progressing Towards Goal     Problem: Falls - Risk of  Goal: *Absence of Falls  Description: Document Reba Vazquez Fall Risk and appropriate interventions in the flowsheet.   Outcome: Progressing Towards Goal  Note: Fall Risk Interventions:  Mobility Interventions: Communicate number of staff needed for ambulation/transfer         Medication Interventions: Teach patient to arise slowly    Elimination Interventions: Call light in reach    History of Falls Interventions: Consult care management for discharge planning         Problem: Patient Education:  Go to Education Activity  Goal: Patient/Family Education  Outcome: Progressing Towards Goal     Problem: Diabetes Self-Management  Goal: *Disease process and treatment process  Description: Define diabetes and identify own type of diabetes; list 3 options for treating diabetes. Outcome: Progressing Towards Goal  Goal: *Incorporating nutritional management into lifestyle  Description: Describe effect of type, amount and timing of food on blood glucose; list 3 methods for planning meals. Outcome: Progressing Towards Goal  Goal: *Incorporating physical activity into lifestyle  Description: State effect of exercise on blood glucose levels. Outcome: Progressing Towards Goal  Goal: *Developing strategies to promote health/change behavior  Description: Define the ABC's of diabetes; identify appropriate screenings, schedule and personal plan for screenings. Outcome: Progressing Towards Goal  Goal: *Using medications safely  Description: State effect of diabetes medications on diabetes; name diabetes medication taking, action and side effects. Outcome: Progressing Towards Goal  Goal: *Monitoring blood glucose, interpreting and using results  Description: Identify recommended blood glucose targets  and personal targets. Outcome: Progressing Towards Goal  Goal: *Prevention, detection, treatment of acute complications  Description: List symptoms of hyper- and hypoglycemia; describe how to treat low blood sugar and actions for lowering  high blood glucose level. Outcome: Progressing Towards Goal  Goal: *Prevention, detection and treatment of chronic complications  Description: Define the natural course of diabetes and describe the relationship of blood glucose levels to long term complications of diabetes.   Outcome: Progressing Towards Goal  Goal: *Developing strategies to address psychosocial issues  Description: Describe feelings about living with diabetes; identify support needed and support network  Outcome: Progressing Towards Goal  Goal: *Insulin pump training  Outcome: Progressing Towards Goal  Goal: *Sick day guidelines  Outcome: Progressing Towards Goal  Goal: *Patient Specific Goal (EDIT GOAL, INSERT TEXT)  Outcome: Progressing Towards Goal     Problem: Gas Exchange - Impaired  Goal: Absence of hypoxia  Outcome: Progressing Towards Goal  Goal: Promote optimal lung function  Outcome: Progressing Towards Goal

## 2022-06-26 NOTE — PROGRESS NOTES
Problem: Airway Clearance - Ineffective  Goal: Achieve or maintain patent airway  Outcome: Progressing Towards Goal     Problem: Gas Exchange - Impaired  Goal: Absence of hypoxia  Outcome: Progressing Towards Goal  Goal: Promote optimal lung function  Outcome: Progressing Towards Goal     Problem: Breathing Pattern - Ineffective  Goal: Ability to achieve and maintain a regular respiratory rate  Outcome: Progressing Towards Goal     Problem:  Body Temperature -  Risk of, Imbalanced  Goal: Ability to maintain a body temperature within defined limits  Outcome: Progressing Towards Goal  Goal: Will regain or maintain usual level of consciousness  Outcome: Progressing Towards Goal  Goal: Complications related to the disease process, condition or treatment will be avoided or minimized  Outcome: Progressing Towards Goal     Problem: Isolation Precautions - Risk of Spread of Infection  Goal: Prevent transmission of infectious organism to others  Outcome: Progressing Towards Goal     Problem: Nutrition Deficits  Goal: Optimize nutrtional status  Outcome: Progressing Towards Goal     Problem: Risk for Fluid Volume Deficit  Goal: Maintain normal heart rhythm  Outcome: Progressing Towards Goal  Goal: Maintain absence of muscle cramping  Outcome: Progressing Towards Goal  Goal: Maintain normal serum potassium, sodium, calcium, phosphorus, and pH  Outcome: Progressing Towards Goal     Problem: Fatigue  Goal: Verbalize increase energy and improved vitality  Outcome: Progressing Towards Goal     Problem: Loneliness or Risk for Loneliness  Goal: Demonstrate positive use of time alone when socialization is not possible  Outcome: Progressing Towards Goal     Problem: Patient Education: Go to Patient Education Activity  Goal: Patient/Family Education  Outcome: Progressing Towards Goal     Problem: Risk for Spread of Infection  Goal: Prevent transmission of infectious organism to others  Description: Prevent the transmission of infectious organisms to other patients, staff members, and visitors. Outcome: Progressing Towards Goal     Problem: Falls - Risk of  Goal: *Absence of Falls  Description: Document [place] Fall Risk and appropriate interventions in the flowsheet. Outcome: Progressing Towards Goal  Note: Fall Risk Interventions:  Mobility Interventions: Communicate number of staff needed for ambulation/transfer              Elimination Interventions: Call light in reach    History of Falls Interventions: Consult care management for discharge planning         Problem: Patient Education: Go to Patient Education Activity  Goal: Patient/Family Education  Outcome: Progressing Towards Goal     Problem: Diabetes Self-Management  Goal: *Disease process and treatment process  Description: Define diabetes and identify own type of diabetes; list 3 options for treating diabetes. Outcome: Progressing Towards Goal  Goal: *Incorporating nutritional management into lifestyle  Description: Describe effect of type, amount and timing of food on blood glucose; list 3 methods for planning meals. Outcome: Progressing Towards Goal  Goal: *Incorporating physical activity into lifestyle  Description: State effect of exercise on blood glucose levels. Outcome: Progressing Towards Goal  Goal: *Developing strategies to promote health/change behavior  Description: Define the ABC's of diabetes; identify appropriate screenings, schedule and personal plan for screenings. Outcome: Progressing Towards Goal  Goal: *Using medications safely  Description: State effect of diabetes medications on diabetes; name diabetes medication taking, action and side effects. Outcome: Progressing Towards Goal  Goal: *Monitoring blood glucose, interpreting and using results  Description: Identify recommended blood glucose targets  and personal targets.   Outcome: Progressing Towards Goal  Goal: *Prevention, detection, treatment of acute complications  Description: List symptoms of hyper- and hypoglycemia; describe how to treat low blood sugar and actions for lowering  high blood glucose level. Outcome: Progressing Towards Goal  Goal: *Prevention, detection and treatment of chronic complications  Description: Define the natural course of diabetes and describe the relationship of blood glucose levels to long term complications of diabetes.   Outcome: Progressing Towards Goal  Goal: *Developing strategies to address psychosocial issues  Description: Describe feelings about living with diabetes; identify support needed and support network  Outcome: Progressing Towards Goal  Goal: *Insulin pump training  Outcome: Progressing Towards Goal  Goal: *Sick day guidelines  Outcome: Progressing Towards Goal  Goal: *Patient Specific Goal (EDIT GOAL, INSERT TEXT)  Outcome: Progressing Towards Goal     Problem: Patient Education: Go to Patient Education Activity  Goal: Patient/Family Education  Outcome: Progressing Towards Goal     Problem: Patient Education: Go to Patient Education Activity  Goal: Patient/Family Education  Outcome: Progressing Towards Goal

## 2022-06-26 NOTE — DISCHARGE SUMMARY
Physician Discharge Summary     Patient ID:    Sean Sarah  [id number]  [unfilled]  1955    Admit date: 6/23/2022    Discharge date and time: 6/26/2022    Hospital Diagnoses  Dehydration      Hypomagnesia anemia      Hypokalemia      Metabolic encephalopathy  -Secondary to COVID-19,      COVID-19-         Hypertension- ns        Diabete uncontrolled, due to diet noncompliance     Diabetic neuropathy       Treatment Rendered:   75 y.o. male presented with altered mental status, lower extremity weakness, nonproductive cough, was diagnosed with COVID-19, profound dehydration, electrolyte derangement, patient does not require oxygen, chest x-ray clear, status post IV fluids, electrolyte replacement, supportive care, patient's muscle strength improved, able to ambulate safely with home walker, was discharged in stable condition    Detail by problem list   Dehydration-status post IV fluids     Hypomagnesia anemia-status post replacement     Hypokalemia-status post replacement     Metabolic encephalopathy  -Secondary to COVID-19, altered mental status resolved, MRI brain negative, CVA work-up are negative,        COVID-19-no evidence of pneumonia, very symptomatic diffuse weakness, continue supportive care, without additional antibiotics, adjunct vitamin therapy, daily physical and occupational therapy, might require acute rehab placement    Afebrile for more than 24 hours on June 26, able to ambulate safely with walker at bedside, which patient has at home , medically stable for discharge     Hypertension-holding blood pressure medications, resume when indicated  June 26-we will resume home blood pressure medications        Diabetes-resume home insulin regiment and adjust as needed  Diabetic neuropathy-continue home gabapentin, adjust as needed             Chronic Diagnoses:    Problem List as of 6/26/2022 Date Reviewed: 6/24/2022          Codes Class Noted - Resolved    Essential hypertension ICD-10-CM: I10  ICD-9-CM: 401.9  6/24/2022 - Present        Hyperlipidemia ICD-10-CM: E78.5  ICD-9-CM: 272.4  6/24/2022 - Present        Obesity ICD-10-CM: E66.9  ICD-9-CM: 278.00  6/24/2022 - Present        Sepsis ([address] ICD-10-CM: A41.9  ICD-9-CM: 038.9, 995.91  6/23/2022 - Present        Diabetic neuropathy (H[facility]) ICD-10-CM: E11.40  ICD-9-CM: 250.60, 357.2  12/11/2020 - Present        Sleep apnea ICD-10-CM: G47.30  ICD-9-CM: 780.57  12/11/2020 - Present        Cardiac murmur ICD-10-CM: R01.1  ICD-9-CM: 785.2  8/15/2017 - Present        Tear of meniscus of right knee ICD-10-CM: H89.665Q  ICD-9-CM: 836.2  10/7/2014 - Present        Tear of meniscus of left knee ICD-10-CM: I94.894C  ICD-9-CM: 836.2  6/10/2014 - Present        RESOLVED: GI bleed ICD-10-CM: K92.2  ICD-9-CM: 578.9  2/29/2016 - 3/2/2016        RESOLVED: Chest pain, unspecified ICD-10-CM: R07.9  ICD-9-CM: 786.50  7/30/2013 - 7/31/2013              Discharge Medications:   Current Discharge Medication List      CONTINUE these medications which have NOT CHANGED    Details   albuterol (PROVENTIL HFA, VENTOLIN HFA, PROAIR HFA) 90 mcg/actuation inhaler Take 2 Puffs by inhalation as needed. gabapentin (NEURONTIN) 400 mg capsule Take 800 mg by mouth two (2) times a day. multivitamins-minerals-lutein (Multivitamin 50 Plus) tab tablet Take 1 Tablet by mouth daily. buPROPion XL (WELLBUTRIN XL) 150 mg tablet Take 150 mg by mouth daily. cholecalciferol (Vitamin D3) 25 mcg (1,000 unit) cap Take 2,000 Units by mouth daily. montelukast (SINGULAIR) 10 mg tablet Take 10 mg by mouth nightly. DULoxetine (CYMBALTA) 60 mg capsule Take 60 mg by mouth daily. melatonin 5 mg cap capsule Take 5 mg by mouth nightly as needed. omeprazole (PRILOSEC) 40 mg capsule Take 40 mg by mouth Daily (before breakfast). metFORMIN (GLUCOPHAGE) 500 mg tablet Take 2 Tablets by mouth two (2) times a day.       fluticasone propionate (FLONASE ALLERGY RELIEF) 50 mcg/actuation nasal spray 2 Sprays by Both Nostrils route daily. Indications: inflammation of the nose due to an allergy      insulin lispro (HUMALOG) 100 unit/mL injection 10 Units by SubCUTAneous route two (2) times a day. LUNCH AND DINNER      quinapril (ACCUPRIL) 40 mg tablet Take 40 mg by mouth two (2) times a day. rosuvastatin (CRESTOR) 40 mg tablet Take 40 mg by mouth nightly. NON FORMULARY       famotidine (PEPCID) 20 mg tablet Take 20 mg by mouth two (2) times a day. Indications: gastroesophageal reflux disease               Follow up Care:    1. Shabana Hyde MD in 1-2 weeks. Please call to set up an appointment shortly after discharge. Diet:  Regular Diet    Disposition:  Home. Advanced Directive:   FULL x   DNR      Discharge Exam:  See today's note. CONSULTATIONS: Nonex    Significant Diagnostic Studies:   6/23/2022: BUN 14 MG/DL (Ref range: 6 - 20 MG/DL); Calcium 9.6 MG/DL (Ref range: 8.5 - 10.1 MG/DL); CO2 27 mmol/L (Ref range: 21 - 32 mmol/L); Creatinine 1.16 MG/DL (Ref range: 0.70 - 1.30 MG/DL); Glucose 197 mg/dL (H; Ref range: 65 - 100 mg/dL); HCT 39.9 % (Ref range: 36.6 - 50.3 %); HGB 13.1 g/dL (Ref range: 12.1 - 17.0 g/dL); Potassium 4.3 mmol/L (Ref range: 3.5 - 5.1 mmol/L); Sodium 135 mmol/L (L; Ref range: 136 - 145 mmol/L)  6/24/2022: BUN 10 MG/DL (Ref range: 6 - 20 MG/DL); Calcium 9.1 MG/DL (Ref range: 8.5 - 10.1 MG/DL); CO2 29 mmol/L (Ref range: 21 - 32 mmol/L); Creatinine 0.94 MG/DL (Ref range: 0.70 - 1.30 MG/DL); Glucose 172 mg/dL (H; Ref range: 65 - 100 mg/dL); HCT 36.5 % (L; Ref range: 36.6 - 50.3 %); HGB 11.8 g/dL (L; Ref range: 12.1 - 17.0 g/dL); Potassium 3.7 mmol/L (Ref range: 3.5 - 5.1 mmol/L);  Sodium 135 mmol/L (L; Ref range: 136 - 145 mmol/L)  Recent Labs     06/26/22  0530 06/25/22  0555   WBC 4.4 3.1*   HGB 12.6 11.0*   HCT 38.8 34.0*   * 82*     Recent Labs     06/26/22  0530 06/25/22  0555 06/24/22  0448 06/23/22  1722 06/23/22  1722    137 135*   < > 135*   K 3.9 3.9 3.7   < > 4.3    106 102   < > 101   CO2 27 23 29   < > 27   BUN 9 8 10   < > 14   CREA 0.89 0.65* 0.94   < > 1.16   * 135* 172*   < > 197*   CA 9.3 8.5 9.1   < > 9.6   MG  --  1.5*  --   --  1.9    < > = values in this interval not displayed. Recent Labs     06/26/22  0530 06/25/22  0555 06/24/22  0448   AP 87 62 85   TP 6.8 5.0* 6.7   ALB 3.2* 2.4* 3.3*   GLOB 3.6 2.6 3.4     Recent Labs     06/23/22  1722   INR 1.1   PTP 11.2*      No results for input(s): FE, TIBC, PSAT, FERR in the last 72 hours. No results for input(s): PH, PCO2, PO2 in the last 72 hours. No results for input(s): CPK, CKMB in the last 72 hours. No lab exists for component: TROPONINI  No components found for: GLPOC          Time Spent on Discharge greater than 30 minutes.     Signed:  Aguilar Clayton DO  6/26/2022  4:16 PM

## 2022-06-26 NOTE — PROGRESS NOTES
Discharge Note:  Patient discharge education instructions completed. Pt without questions. RN went over [employer] and [facility]. PIV d/c'd intact, pressure held. Patient with question about cough syrup, nurse instructed pt to call Primary Care Provider and ask and instructed about follow per discharge instructions. Patient discharged via wheelchair, by PCT.  Wife met pt in [facility].

## 2022-06-26 NOTE — PROGRESS NOTES
The patient's wife ask if the patient insulin was going to be changed while he is at the hospital, because he has not received his long acting insulin since being here. The patient's wife states that she does not know the name of the insulin, but her  goes through the [facility] and he has there phone number in his cell phone. She just wants to be made aware if anything changes with his diabetes management. Problem: Airway Clearance - Ineffective  Goal: Achieve or maintain patent airway  6/26/2022 0445 by Maria L Espinosa RN  Outcome: Progressing Towards Goal  6/26/2022 0024 by Maria L Espinosa RN  Outcome: Progressing Towards Goal     Problem: Gas Exchange - Impaired  Goal: Absence of hypoxia  6/26/2022 0445 by Maria L Espinosa RN  Outcome: Progressing Towards Goal  6/26/2022 0024 by Maria L Espinosa RN  Outcome: Progressing Towards Goal  Goal: Promote optimal lung function  6/26/2022 0445 by Maria L Espinosa RN  Outcome: Progressing Towards Goal  6/26/2022 0024 by Maria L Espinosa RN  Outcome: Progressing Towards Goal     Problem: Breathing Pattern - Ineffective  Goal: Ability to achieve and maintain a regular respiratory rate  6/26/2022 0445 by Maria L Espinosa RN  Outcome: Progressing Towards Goal  6/26/2022 0024 by Maria L Espinosa RN  Outcome: Progressing Towards Goal     Problem:  Body Temperature -  Risk of, Imbalanced  Goal: Ability to maintain a body temperature within defined limits  6/26/2022 0445 by Maria L Espinosa RN  Outcome: Progressing Towards Goal  6/26/2022 0024 by Maria L Espinosa RN  Outcome: Progressing Towards Goal  Goal: Will regain or maintain usual level of consciousness  6/26/2022 0445 by Maria L Espinosa RN  Outcome: Progressing Towards Goal  6/26/2022 0024 by Maria L Espinosa RN  Outcome: Progressing Towards Goal  Goal: Complications related to the disease process, condition or treatment will be avoided or minimized  6/26/2022 0445 by Maria L Espinosa RN  Outcome: Progressing Towards Goal  6/26/2022 0024 by Yumkio Bergeron RN  Outcome: Progressing Towards Goal     Problem:  Body Temperature -  Risk of, Imbalanced  Goal: Ability to maintain a body temperature within defined limits  6/26/2022 0445 by Yumiko Bergeron RN  Outcome: Progressing Towards Goal  6/26/2022 0024 by Yumiko Bergeron RN  Outcome: Progressing Towards Goal  Goal: Will regain or maintain usual level of consciousness  6/26/2022 0445 by Yumiko Bergeron RN  Outcome: Progressing Towards Goal  6/26/2022 0024 by Yumiko Bergeron RN  Outcome: Progressing Towards Goal  Goal: Complications related to the disease process, condition or treatment will be avoided or minimized  6/26/2022 0445 by Yumiko Bergeron RN  Outcome: Progressing Towards Goal  6/26/2022 0024 by Yumiko Bergeron RN  Outcome: Progressing Towards Goal     Problem: Isolation Precautions - Risk of Spread of Infection  Goal: Prevent transmission of infectious organism to others  6/26/2022 0445 by Yumiko Bergeron RN  Outcome: Progressing Towards Goal  6/26/2022 0024 by Yumiko Bergeron RN  Outcome: Progressing Towards Goal     Problem: Nutrition Deficits  Goal: Optimize nutrtional status  6/26/2022 0445 by Yumiko Bergeron RN  Outcome: Progressing Towards Goal  6/26/2022 0024 by Yumiko Bergeron RN  Outcome: Progressing Towards Goal     Problem: Risk for Fluid Volume Deficit  Goal: Maintain normal heart rhythm  6/26/2022 0445 by Yumiko Bergeron RN  Outcome: Progressing Towards Goal  6/26/2022 0024 by Yumiko Bergeron RN  Outcome: Progressing Towards Goal  Goal: Maintain absence of muscle cramping  6/26/2022 0445 by Yumiko Bergeron RN  Outcome: Progressing Towards Goal  6/26/2022 0024 by Yumiko Bergeron RN  Outcome: Progressing Towards Goal  Goal: Maintain normal serum potassium, sodium, calcium, phosphorus, and pH  6/26/2022 0445 by Yumiko Bergeron RN  Outcome: Progressing Towards Goal  6/26/2022 0024 by Yumiko Bergeron RN  Outcome: Progressing Towards Goal     Problem: Loneliness or Risk for Loneliness  Goal: Demonstrate positive use of time alone when socialization is not possible  6/26/2022 0445 by Lily Arevalo RN  Outcome: Progressing Towards Goal  6/26/2022 0024 by Lily Arevalo RN  Outcome: Progressing Towards Goal     Problem: Fatigue  Goal: Verbalize increase energy and improved vitality  6/26/2022 0445 by Lily Arevalo RN  Outcome: Progressing Towards Goal  6/26/2022 0024 by Lily Arevalo RN  Outcome: Progressing Towards Goal     Problem: Patient Education: Go to Patient Education Activity  Goal: Patient/Family Education  6/26/2022 0445 by Lily Arevalo RN  Outcome: Progressing Towards Goal  6/26/2022 0024 by Lily Arevalo RN  Outcome: Progressing Towards Goal     Problem: Risk for Spread of Infection  Goal: Prevent transmission of infectious organism to others  Description: Prevent the transmission of infectious organisms to other patients, staff members, and visitors. 6/26/2022 0445 by Lily Arevalo RN  Outcome: Progressing Towards Goal  6/26/2022 0024 by Lily Arevalo RN  Outcome: Progressing Towards Goal     Problem: Patient Education:  Go to Education Activity  Goal: Patient/Family Education  6/26/2022 0445 by Lily Arevalo RN  Outcome: Progressing Towards Goal  6/26/2022 0024 by Lily Arevalo RN  Outcome: Progressing Towards Goal     Problem: Patient Education:  Go to Education Activity  Goal: Patient/Family Education  6/26/2022 0445 by Lily Arevalo RN  Outcome: Progressing Towards Goal  6/26/2022 0024 by Lily Arevalo RN  Outcome: Progressing Towards Goal     Problem: Falls - Risk of  Goal: *Absence of Falls  Description: Document Jeffrey Sites Fall Risk and appropriate interventions in the flowsheet.   6/26/2022 0445 by Lily Arevalo RN  Outcome: Progressing Towards Goal  Note: Fall Risk Interventions:  Mobility Interventions: Communicate number of staff needed for ambulation/transfer              Elimination Interventions: Call light in reach    History of Falls Interventions: Consult care management for discharge planning      6/26/2022 0024 by Joseline Morse RN  Outcome: Progressing Towards Goal  Note: Fall Risk Interventions:  Mobility Interventions: Communicate number of staff needed for ambulation/transfer              Elimination Interventions: Call light in reach    History of Falls Interventions: Consult care management for discharge planning         Problem: Patient Education: Go to Patient Education Activity  Goal: Patient/Family Education  6/26/2022 0445 by Joseline Morse RN  Outcome: Progressing Towards Goal  6/26/2022 0024 by Joseline Morse RN  Outcome: Progressing Towards Goal     Problem: Diabetes Self-Management  Goal: *Disease process and treatment process  Description: Define diabetes and identify own type of diabetes; list 3 options for treating diabetes. 6/26/2022 0445 by Joseline Morse RN  Outcome: Progressing Towards Goal  6/26/2022 0024 by Joseline Morse RN  Outcome: Progressing Towards Goal  Goal: *Incorporating nutritional management into lifestyle  Description: Describe effect of type, amount and timing of food on blood glucose; list 3 methods for planning meals. 6/26/2022 0445 by Joseline Morse RN  Outcome: Progressing Towards Goal  6/26/2022 0024 by Joseline Morse RN  Outcome: Progressing Towards Goal  Goal: *Incorporating physical activity into lifestyle  Description: State effect of exercise on blood glucose levels. 6/26/2022 0445 by Joseline Morse RN  Outcome: Progressing Towards Goal  6/26/2022 0024 by Joseline Morse RN  Outcome: Progressing Towards Goal  Goal: *Developing strategies to promote health/change behavior  Description: Define the ABC's of diabetes; identify appropriate screenings, schedule and personal plan for screenings.   6/26/2022 0445 by Joseline Morse RN  Outcome: Progressing Towards Goal  6/26/2022 0024 by Joseline Morse RN  Outcome: Progressing Towards Goal  Goal: *Using medications safely  Description: State effect of diabetes medications on diabetes; name diabetes medication taking, action and side effects. 6/26/2022 0445 by Yumiko Bergeron RN  Outcome: Progressing Towards Goal  6/26/2022 0024 by Yumiko Bergeron RN  Outcome: Progressing Towards Goal  Goal: *Monitoring blood glucose, interpreting and using results  Description: Identify recommended blood glucose targets  and personal targets. 6/26/2022 0445 by Yumiko Bergeron RN  Outcome: Progressing Towards Goal  6/26/2022 0024 by Yumiko Bergeron RN  Outcome: Progressing Towards Goal  Goal: *Prevention, detection, treatment of acute complications  Description: List symptoms of hyper- and hypoglycemia; describe how to treat low blood sugar and actions for lowering  high blood glucose level. 6/26/2022 0445 by Yumiko Bergeron RN  Outcome: Progressing Towards Goal  6/26/2022 0024 by Yumiko Bergeron RN  Outcome: Progressing Towards Goal  Goal: *Prevention, detection and treatment of chronic complications  Description: Define the natural course of diabetes and describe the relationship of blood glucose levels to long term complications of diabetes.   6/26/2022 0445 by Yumiko Bergerno RN  Outcome: Progressing Towards Goal  6/26/2022 0024 by Yumiko Bergeron RN  Outcome: Progressing Towards Goal  Goal: *Developing strategies to address psychosocial issues  Description: Describe feelings about living with diabetes; identify support needed and support network  6/26/2022 0445 by Yumiko Bergeron RN  Outcome: Progressing Towards Goal  6/26/2022 0024 by Yumiko Bergeron RN  Outcome: Progressing Towards Goal  Goal: *Insulin pump training  6/26/2022 0445 by Yumiko Bergeron RN  Outcome: Progressing Towards Goal  6/26/2022 0024 by Yumiko Bergeron RN  Outcome: Progressing Towards Goal  Goal: *Sick day guidelines  6/26/2022 0445 by Yumiko Bergeron RN  Outcome: Progressing Towards Goal  6/26/2022 0024 by Yumiko Bergeron RN  Outcome: Progressing Towards Goal  Goal: *Patient Specific Goal (EDIT GOAL, INSERT TEXT)  6/26/2022 4194 by Karen Tejada RN  Outcome: Progressing Towards Goal  6/26/2022 0024 by Karen Tejada RN  Outcome: Progressing Towards Goal     Problem: Patient Education: Go to Patient Education Activity  Goal: Patient/Family Education  6/26/2022 0445 by Karen Tejada RN  Outcome: Progressing Towards Goal  6/26/2022 0024 by Karen Tejada RN  Outcome: Progressing Towards Goal     Problem: Patient Education: Go to Patient Education Activity  Goal: Patient/Family Education  6/26/2022 0445 by Karen Tejada RN  Outcome: Progressing Towards Goal  6/26/2022 0024 by Karen Tejada RN  Outcome: Progressing Towards Goal

## 2022-06-26 NOTE — PROGRESS NOTES
Contacted wife regarding discharge. Notified her patient is being discharged today. She can  patient this afternoon. Wife said they have walker at home.

## 2022-06-26 NOTE — PROGRESS NOTES
1815: TRANSFER - OUT REPORT:    Verbal report given to Claudia Ibarra RN at 18:15 (name) on Milly Lozoya  being transferred to [address] (unit) for routine progression of care       Report consisted of patients Situation, Background, Assessment and   Recommendations(SBAR). Information from the following report(s) SBAR was reviewed with the receiving nurse. Lines:   Peripheral IV 06/23/22 Right Antecubital (Active)   Site Assessment Clean, dry, & intact 06/25/22 1909   Phlebitis Assessment 0 06/25/22 1909   Infiltration Assessment 0 06/25/22 1909   Dressing Status Clean, dry, & intact 06/25/22 1909   Dressing Type Transparent;Tape 06/25/22 1909   Hub Color/Line Status Pink;Patent; Infusing 06/25/22 1909   Action Taken Open ports on tubing capped 06/25/22 1909   Alcohol Cap Used Yes 06/25/22 1909        Opportunity for questions and clarification was provided.       Patient transported with:   [employer]

## 2022-06-27 ENCOUNTER — PATIENT OUTREACH (OUTPATIENT)
Dept: CASE MANAGEMENT | Age: 67
End: 2022-06-27

## 2022-06-28 NOTE — PROGRESS NOTES
Care Transitions Outreach Attempt    Call within 2 business days of discharge: Yes   Attempted to reach patient for transitions of care follow up. Unable to reach patient. Patient: Karo Alberts Patient : 1955 MRN: [id number]    Last Discharge [facility] Facility       Complaint Diagnosis Description Type Department Provider    22 Extremity Weakness Sepsis, due to unspecified organism, unspecified whether acute organ dysfunction present ([facility] [address] . .. ED to Hosp-Admission (Discharged) (ADMIT) Zoey Deluca DO; Theodora Cheadle... Was this an external facility discharge? No       Noted following upcoming appointments from discharge chart review:   [facility] follow up appointment(s): No future appointments.   Non-[facility] follow up appointment(s): patient needs PCP appt

## 2022-06-28 NOTE — PROGRESS NOTES
Physician Progress Note      PATIENT:               Jason Preston  CSN #:                  [id number]  :                       1955  ADMIT DATE:       2022 4:57 PM  [address] [place] DATE:        2022 6:00 PM  RESPONDING  PROVIDER #:        Suyapa Fox DO          QUERY TEXT:    Patient admitted with AMS and Weakness. Patient noted to be profoundly dehydrated and Covid+. Noted documentation of Sepsis as an active hospital problem in H&P and appears in the Daily Attending progress notes in the Active Problems list from H&P-Discharge. Please indicate if the diagnosis was ruled out or if Sepsis was present and treated. ? Please update active hospital problems appropriately to reflect response. The medical record reflects the following:  Risk Factors: 67M Dehydrated, Covid+  Clinical Indicators:    Mandi Zuniga MD ED Provider   Working Diagnosis Sepsis  [facility]  Assessment  Sepsis  Active Hospital Problems List  Sepsis   [address] Problems List  Sepsis    ProCal 0.12  WBC 3.7, 3.1, 4.4     1717 100.6 95 17 155/6 93% RA   2002 98.9 89 14 155/71 94% RA   1535 100.6 79 21 176/79 93% RA    Treatment: 1L NS IV Bolus, NS IV 75ml/hr changed to   ml/hr changed to 75 ml/hr, Daily Lab Monitoring  Options provided:  -- Sepsis was ruled out after study  -- Sepsis was treated as evidenced by, Please document clinical support. -- Other - I will add my own diagnosis  -- Disagree - Not applicable / Not valid  -- Disagree - Clinically unable to determine / Unknown  -- Refer to Clinical Documentation Reviewer    PROVIDER RESPONSE TEXT:    Sepsis was ruled out after study.     Query created by: Lu Goetz on 2022 9:32 AM      Electronically signed by:  Suyapa Fox DO 2022 3:04 PM

## 2022-06-30 LAB
INFLUENZA A AB, IGG 828524: 5.8 IV
INFLUENZA A VIRUS IGM: 0.14 IV
INFLUENZA B VIRUS AB, IGG: 1.91 IV
INFLUENZA B VIRUS AB, IGM: 0.06 IV

## 2022-07-13 ENCOUNTER — PATIENT OUTREACH (OUTPATIENT)
Dept: CASE MANAGEMENT | Age: 67
End: 2022-07-13

## 2022-07-13 NOTE — PROGRESS NOTES
Care Transitions Outreach Attempt    Call within 2 business days of discharge: Yes   Attempted to reach patient for transitions of care follow up. Unable to reach patient. Patient: Cj Major Patient : 1955 MRN: [id number]    Last Discharge [facility] Facility       Complaint Diagnosis Description Type Department Provider    22 Extremity Weakness Sepsis, due to unspecified organism, unspecified whether acute organ dysfunction present ([facility] [address] . .. ED to Hosp-Admission (Discharged) (ADMIT) Dilan Márquez DO; Deepti Justin... Was this an external facility discharge? No     Noted following upcoming appointments from discharge chart review:   [facility] follow up appointment(s): No future appointments.   Non-[facility] follow up appointment(s): none at this time per chart review

## 2023-05-23 RX ORDER — FAMOTIDINE 20 MG/1
20 TABLET, FILM COATED ORAL 2 TIMES DAILY
COMMUNITY

## 2023-05-23 RX ORDER — QUINAPRIL 40 MG/1
40 TABLET ORAL 2 TIMES DAILY
COMMUNITY

## 2023-05-23 RX ORDER — GABAPENTIN 400 MG/1
800 CAPSULE ORAL 2 TIMES DAILY
COMMUNITY
Start: 2022-05-09

## 2023-05-23 RX ORDER — DULOXETIN HYDROCHLORIDE 60 MG/1
60 CAPSULE, DELAYED RELEASE ORAL DAILY
COMMUNITY
Start: 2022-03-18

## 2023-05-23 RX ORDER — ALBUTEROL SULFATE 90 UG/1
2 AEROSOL, METERED RESPIRATORY (INHALATION) PRN
COMMUNITY
Start: 2022-06-16

## 2023-05-23 RX ORDER — FLUTICASONE PROPIONATE 50 MCG
2 SPRAY, SUSPENSION (ML) NASAL DAILY
COMMUNITY

## 2023-05-23 RX ORDER — MONTELUKAST SODIUM 10 MG/1
10 TABLET ORAL NIGHTLY
COMMUNITY
Start: 2022-04-27

## 2023-05-23 RX ORDER — OMEPRAZOLE 40 MG/1
40 CAPSULE, DELAYED RELEASE ORAL
COMMUNITY
Start: 2022-03-27

## 2023-05-23 RX ORDER — BUPROPION HYDROCHLORIDE 150 MG/1
150 TABLET ORAL DAILY
COMMUNITY
Start: 2022-06-15

## 2023-05-23 RX ORDER — INSULIN LISPRO 100 [IU]/ML
10 INJECTION, SOLUTION INTRAVENOUS; SUBCUTANEOUS 2 TIMES DAILY
COMMUNITY

## 2023-05-23 RX ORDER — ROSUVASTATIN CALCIUM 40 MG/1
40 TABLET, COATED ORAL
COMMUNITY

## 2024-01-30 ENCOUNTER — HOSPITAL ENCOUNTER (EMERGENCY)
Facility: HOSPITAL | Age: 69
Discharge: HOME | End: 2024-01-30
Attending: STUDENT IN AN ORGANIZED HEALTH CARE EDUCATION/TRAINING PROGRAM
Payer: MEDICARE

## 2024-01-30 ENCOUNTER — APPOINTMENT (OUTPATIENT)
Facility: HOSPITAL | Age: 69
End: 2024-01-30
Payer: MEDICARE

## 2024-01-30 VITALS
WEIGHT: 231.48 LBS | DIASTOLIC BLOOD PRESSURE: 91 MMHG | TEMPERATURE: 97.7 F | RESPIRATION RATE: 16 BRPM | SYSTOLIC BLOOD PRESSURE: 187 MMHG | BODY MASS INDEX: 31.35 KG/M2 | HEIGHT: 72 IN | HEART RATE: 78 BPM | OXYGEN SATURATION: 99 %

## 2024-01-30 DIAGNOSIS — R29.6 FALLS FREQUENTLY: ICD-10-CM

## 2024-01-30 DIAGNOSIS — S02.2XXA CLOSED FRACTURE OF NASAL BONE, INITIAL ENCOUNTER: ICD-10-CM

## 2024-01-30 DIAGNOSIS — T07.XXXA MULTIPLE ABRASIONS: ICD-10-CM

## 2024-01-30 DIAGNOSIS — S00.83XA CONTUSION OF FACE, INITIAL ENCOUNTER: ICD-10-CM

## 2024-01-30 DIAGNOSIS — S09.90XA CLOSED HEAD INJURY, INITIAL ENCOUNTER: Primary | ICD-10-CM
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
## 2024-01-30 PROCEDURE — 90471 IMMUNIZATION ADMIN: CPT | Performed by: STUDENT IN AN ORGANIZED HEALTH CARE EDUCATION/TRAINING PROGRAM

## 2024-01-30 PROCEDURE — 70486 CT MAXILLOFACIAL W/O DYE: CPT

## 2024-01-30 PROCEDURE — 99284 EMERGENCY DEPT VISIT MOD MDM: CPT

## 2024-01-30 PROCEDURE — 90714 TD VACC NO PRESV 7 YRS+ IM: CPT | Performed by: STUDENT IN AN ORGANIZED HEALTH CARE EDUCATION/TRAINING PROGRAM

## 2024-01-30 PROCEDURE — 6370000000 HC RX 637 (ALT 250 FOR IP): Performed by: STUDENT IN AN ORGANIZED HEALTH CARE EDUCATION/TRAINING PROGRAM

## 2024-01-30 PROCEDURE — 6360000002 HC RX W HCPCS: Performed by: STUDENT IN AN ORGANIZED HEALTH CARE EDUCATION/TRAINING PROGRAM

## 2024-01-30 PROCEDURE — 70450 CT HEAD/BRAIN W/O DYE: CPT

## 2024-01-30 RX ORDER — GINSENG 100 MG
CAPSULE ORAL
Status: COMPLETED | OUTPATIENT
Start: 2024-01-30 | End: 2024-01-30

## 2024-01-30 RX ORDER — INSULIN GLARGINE 100 [IU]/ML
INJECTION, SOLUTION SUBCUTANEOUS NIGHTLY
COMMUNITY

## 2024-01-30 RX ORDER — ACETAMINOPHEN 500 MG
1000 TABLET ORAL ONCE
Status: COMPLETED | OUTPATIENT
Start: 2024-01-30 | End: 2024-01-30

## 2024-01-30 RX ORDER — OXYCODONE HYDROCHLORIDE 5 MG/1
5 TABLET ORAL EVERY 8 HOURS PRN
Qty: 9 TABLET | Refills: 0 | Status: SHIPPED | OUTPATIENT
Start: 2024-01-30 | End: 2024-02-02

## 2024-01-30 RX ORDER — INSULIN LISPRO-AABC 100 [IU]/ML
INJECTION, SOLUTION INTRAVENOUS; SUBCUTANEOUS
COMMUNITY

## 2024-01-30 RX ORDER — TETANUS AND DIPHTHERIA TOXOIDS ADSORBED 2; 2 [LF]/.5ML; [LF]/.5ML
0.5 INJECTION INTRAMUSCULAR ONCE
Status: COMPLETED | OUTPATIENT
Start: 2024-01-30 | End: 2024-01-30

## 2024-01-30 RX ADMIN — ACETAMINOPHEN 1000 MG: 500 TABLET ORAL at 20:38

## 2024-01-30 RX ADMIN — TETANUS AND DIPHTHERIA TOXOIDS ADSORBED 0.5 ML: 2; 2 INJECTION INTRAMUSCULAR at 20:52

## 2024-01-30 RX ADMIN — Medication 1 EACH: at 20:52

## 2024-01-30 ASSESSMENT — PAIN SCALES - GENERAL: PAINLEVEL_OUTOF10: 9

## 2024-01-30 ASSESSMENT — LIFESTYLE VARIABLES
HOW MANY STANDARD DRINKS CONTAINING ALCOHOL DO YOU HAVE ON A TYPICAL DAY: 1 OR 2
HOW OFTEN DO YOU HAVE A DRINK CONTAINING ALCOHOL: MONTHLY OR LESS

## 2024-01-30 ASSESSMENT — PAIN DESCRIPTION - ORIENTATION: ORIENTATION: RIGHT

## 2024-01-30 ASSESSMENT — PAIN - FUNCTIONAL ASSESSMENT: PAIN_FUNCTIONAL_ASSESSMENT: 0-10

## 2024-01-31 NOTE — ED PROVIDER NOTES
MEDICATIONS:  Discharge Medication List as of 1/30/2024  9:56 PM        START taking these medications    Details   oxyCODONE (ROXICODONE) 5 MG immediate release tablet Take 1 tablet by mouth every 8 hours as needed for Pain for up to 3 days. Intended supply: 3 days. Take lowest dose possible to manage pain Max Daily Amount: 15 mg, Disp-9 tablet, R-0Normal               (Please note that portions of this note were completed with a voice recognition program.  Efforts were made to edit the dictations but occasionally words are mis-transcribed.)    Hayde Villalta MD (electronically signed)  Emergency Attending Physician / Physician Assistant / Nurse Practitioner              Hayde Villalta MD  01/31/24 0102

## 2024-01-31 NOTE — ED TRIAGE NOTES
Pt and spouse reports that patient was taking the recycling bin out around 6:45 pm today and he tripped and fell in the driveway and hit the R side of his head and the R side of his head on the concrete. Pt denies LOC with fall. Pt reports history of falls and reports he also lost his balance and fell this am. Wife reports they have been in conversation with patient's neurologist and ortho MD about getting a walker  and patient has not wanted to so far.
